# Patient Record
Sex: MALE | Race: WHITE | Employment: OTHER | URBAN - METROPOLITAN AREA
[De-identification: names, ages, dates, MRNs, and addresses within clinical notes are randomized per-mention and may not be internally consistent; named-entity substitution may affect disease eponyms.]

---

## 2020-08-13 ENCOUNTER — HOSPITAL ENCOUNTER (OUTPATIENT)
Dept: PHYSICAL THERAPY | Age: 60
Discharge: HOME | End: 2020-08-13
Payer: MEDICARE

## 2020-08-13 PROCEDURE — 97161 PT EVAL LOW COMPLEX 20 MIN: CPT

## 2020-08-13 PROCEDURE — 97110 THERAPEUTIC EXERCISES: CPT

## 2020-08-13 NOTE — THERAPY EVALUATION
Ruthy Yo : 1960 [place] Secondary:  [facility] [address] [facility], [address], [place] [address] [phone]   [phone] OUTPATIENT PHYSICAL THERAPY:Initial Assessment 2020 ICD-10: Treatment Diagnosis: K59.02 Outlet dysfunction constipation, R27.8 Lack of coordination (muscle incoordination), M62.83 Muscle spasm Precautions/Allergies:  
Patient has no allergy information on record. TREATMENT PLAN: 
Effective Dates: 2020 TO 2020 (90 days). Frequency/Duration: Follow 1 time a week for 6 weeks MEDICAL/REFERRING DIAGNOSIS: 
Outlet dysfunction constipation [K59.02] DATE OF ONSET: 3 years REFERRING PHYSICIAN: Mariana Arrieta MD MD Orders: evaluate and treat Return MD Appointment: 2020 INITIAL ASSESSMENT:  Mr. Juan Alarcon presents with over activity and impaired coordination of pelvic floor muscles. He has a complex past medical history of bowel dysfunction. He is unable to coordinate a pelvic floor muscle contraction or relaxation. He required verbal cuing for proper activation and isolation of pelvic floor muscles. He has pain with palpation to the coccyx and PFM via rectal examination. He will continue to benefit from skilled physical therapy to address above mentioned deficits and restore normal PFM function post operatively to minimize urinary incontinence PROBLEM LIST (Impacting functional limitations): 1. Decreased Flexibility/Joint Mobility 2. Decreased coordination 3. Decreased strength of PFM INTERVENTIONS PLANNED: 
1. Neuromuscular re-education 2. Biofeedback as needed 3. HEP 4. Bowel retraining 5. Bowelducation 6. Therapeutic Activites 7. Therapeutic Exercise/Strengthening 8. Bladder training and education GOALS: (Goals have been discussed and agreed upon with patient.) Short Term Goals: (3 weeks) 1. Pt will demonstrate understanding of and ability to teach back appropriate water and fiber intake, toileting frequency, and positioning for improved self-management of symptoms. 2. Pt will demonstrate understanding of and ability to teach back two strategies to reduce constipation and improve toileting to minimize strain on the pelvic floor muscles. 3. Pt will demonstrate ability to perform diaphragmatic breathing in multiple positions in order to assist with pelvic floor lengthening and reduced discomfort to aid in bowel evacuation. 4. Pt will engage in 30 minutes of physical activity per day in order to assist in digestion and ease of passing stools. Long Term Goals: (6 weeks) 1. Pt will demonstrate ability to increase intra-abdominal pressure and eccentrically contract the pelvic floor muscles without breath holding, as assessed by digitally or with use of sEMG biofeedback. 2. Pt will report minimal to no pain upon examination of the levator ani muscles and report minimal or no discomfort with bowel evacuation. 3. Pt will report being able to wean from taking stool softeners and laxatives to have a bowel movement Outcome Measure: NIH- Chronic Prostatitis Symptom Index (NIH-CPSI) for Males Initial:  Most Recent:   
Pain 0 Urinary Symptoms 0   
QOL Index 0 Interpretation of Score: The NIH-CPSI has a total score range from 0 to 43, and it includes three subscales addressing pain (score range 021), urinary symptoms (score range 010), and quality of life (QOL) (score range 012). Medical Necessity:  
· Patient is expected to demonstrate progress in  coordination and functional technique to minimize bowel dysfunctions. Reason for Services/Other Comments: 
· Patient continues to require skilled intervention due to above mentioned deficits. Total Duration: PT Patient Time In/Time Out Time In: 1100 Time Out: 1200 Rehabilitation Potential For Stated Goals: Vilinda Angelucci 
 Regarding Willam Almonte therapy, I certify that the treatment plan above will be carried out by a therapist or under their direction. Thank you for this referral, 
VITALIY CamachoT Referring Physician Signature: Roberto Gordon MD              Date Ambulatory/Rehab Services H2 Model Falls Risk Assessment Risk Factors: 
     (1)  Gender [Male] Ability to Rise from Chair: 
     (0)  Ability to rise in a single movement Falls Prevention Plan: No modifications necessary Total: (5 or greater = High Risk): 1 ©2010 [facility] [employer] Patent #[id number]. Federal Law prohibits the replication, distribution or use without written permission from [facility] [employer] HISTORY:  
History of Present Injury/Illness (Reason for Referral): 
Pt is a 57 yo male referred to pelvic floor physical therapy (PFPT) 2/2 outlet dysfunction by Roberto Gordon MD.  Patient reports his colon closed up after his heart surgery (3 years ago). It took him a while for an MD to listen to him. The pain is gone from his colon but he has trouble pooping. He has to make it liquid in order to poop. He takes dulcolax and prune juice. Miralax doesn't make it liquid anymore. Was doing physical therapy in [facility] for pelvic floor but they did not do any internal. The drops have helped. He has to go to bed at 6 at night because he will have to poop all night. Urinary: Frequency 5-9 x/day, 0 x/night. Positive for urinary frequency, incomplete emptying and urinary hesitancy/intermittency. Negative for stress urinary incontinence, urge urinary incontinence, urinary urgency, dysuria, hematuria, nocturia and nocturnal enuresis. Pt does not use pads for protection; 0 pads per day (PPD).  
          Fluid intake: 3-4 bottles  water/day; bladder irritants include: decaf tea, coffee in am 
 Bowel: Frequency every 12 hours (2-3 hours to get it out). Positive for pain with bowel movement, pushing/straining with bowel movement, constipation and incomplete emptying. Negative for fecal incontinence. Pt does not use pads for protection; 0 pads per day (PPD). Use of stool softeners or laxatives? YES Sexual: Pt is sexually active with female partners. negative for dyspareunia. History of sexual abuse:  NO:  
       Pelvic Pain: varies, more rectal pain Past Medical History/Comorbidities:  
Mr. Lizz Geller  has no past medical history on file. Mr. Lizz Geller  has no past surgical history on file. Social History/Living Environment:  
  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   
      Sex:  male Age:  56 y.o. Current Medications:  No current outpatient medications on file. Date Last Reviewed:  8/13/2020 Number of Personal Factors/Comorbidities that affect the Plan of Care: 1-2: MODERATE COMPLEXITY EXAMINATION:  
 
OBSERVATION:  
External Observations: ? Voluntary contraction: [] absent     [x] present 
? Skin integrity: bruising noted around coccyx and in between gluteal folds Pelvic Floor Muscle (PFM) Assessment: ? Muscle volume: [] decreased     [x] WNL     [] Defect ? PFM tension: [] decreased     [x] increased     [] WNL Contraction ability: 
Voluntary contraction: [] absent     [x] weak     [] moderate      [] strong Voluntary relaxation: [x] absent     [] partial     [] complete MMT: 2/5 Overflow: [] absent     [] min     [x] mod     [x] severe / Compensatory mm groups include abomindals Palpation: via rectal canal  
Superficial Pelvic Floor Musculature (PFM): Tender? Intermediate PFM Tender? Deep PFM Tender?   
Superficial Transverse Perineal [] Right  [] Left  [x]N/T Deep Transverse Perineal [] Right  [] Left  [x]N/T Puborectalis [x] Right  [x] Left  []N/T Ischiocavernosus [] Right  [] Left  [x]N/T Compressor Urethra [] Right  [] Left  [x]N/T Pubococcygeus [x] Right  [x] Left  []N/T Bulbocavernosus [] Right  [] Left  [x]N/T   Ileococcygeus [x] Right  [x] Left  []N/T  
    Obturator Internus [x] Right  [x] Left  []N/T Coccygeus [x] Right  [x] Left  []N/T Breath assessment: 
Coordination of pelvic floor muscles with breath: moderate pelvic floor muscle excursion Body Structures Involved: 1. Muscles Body Functions Affected: 1. Genitourinary 2. Neuromusculoskeletal 
3. Movement Related Activities and Participation Affected: 1. Self Care 2. Domestic Life 3. Interpersonal Interactions and Relationships 4. Community, Social and [place] Number of elements that affect the Plan of Care: 3: MODERATE COMPLEXITY CLINICAL PRESENTATION:  
Presentation: Evolving clinical presentation with changing clinical characteristics: MODERATE COMPLEXITY CLINICAL DECISION MAKING:  
  
Use of outcome tool(s) and clinical judgement create a POC that gives a: Questionable prediction of patient's progress: MODERATE COMPLEXITY

## 2020-08-13 NOTE — PROGRESS NOTES
Grisel Cortez  : 1960  [place]  Secondary:  [address]  at [facility]  [address], [address], [place] [address]  [phone]   [phone]      OUTPATIENT PHYSICAL THERAPY: Daily Treatment Note 2020  ICD-10: Treatment Diagnosis: K59.02 Outlet dysfunction constipation, R27.8 Lack of coordination (muscle incoordination), M62.83 Muscle spasm    Precautions/Allergies:   Patient has no allergy information on record. TREATMENT PLAN:  Effective Dates: 2020 TO 2020 (90 days). Frequency/Duration: Follow 1 time a week for 6 weeks MEDICAL/REFERRING DIAGNOSIS:  Outlet dysfunction constipation [K59.02]   DATE OF ONSET: 3 years  REFERRING PHYSICIAN: Adriano Patino MD MD Orders: evaluate and treat  Return MD Appointment: 2020     Pre-treatment Symptoms/Complaints:  See evaluation  Pain: Initial:   0 Post Session:  0/10   Medications Last Reviewed:  2020   Updated Objective Findings:  See evaluation note from today   TREATMENT:   THERAPEUTIC EXERCISE: (10 minutes):  Exercises per grid below to improve mobility, strength and coordination. Required minimal visual, verbal and manual cues to promote proper body alignment, promote proper body posture and promote proper body mechanics. Progressed resistance, range and repetitions as indicated. All exercises performed with emphasis on kegel and breathing in order improve coordination, awareness and to minimize symptoms. Date:  20 Date:   Date:     Activity/Exercise Parameters Parameters Parameters   Patient Education Discussed HEP and POC     Drops Reviewed for HEP     Breathing Reviewed for HEP                                Pt gives verbal consent to internal  assessment/treatment no chaperon present.      [employer] Portal     Treatment/Session Summary:  Pt reports good understanding of plan of care, as well as prescribed home exercise program.  All questions were answered to pt's satisfaction. Pt was invited to call with any further questions or concerns. · Response to Treatment:  see evaluation. · Communication/Consultation:  None today  · Equipment provided today:  None today  · Recommendations/Intent for next treatment session: Next visit will focus on manual therapy, defecation dynamics, stretches.   Total Treatment Billable Duration:  10 minutes  PT Patient Time In/Time Out  Time In: 1100  Time Out: 210 [facility], DPT    Future Appointments   Date Time Provider Delmar Green   8/18/2020  8:00 AM Louretta Canes, DPT [facility]   8/31/2020  8:00 AM Louretta Canes, DPT [facility]   9/8/2020  8:00 AM Louretta Canes, DPT [facility]   9/14/2020  8:00 AM Louretta Canes, DPT [facility]   9/21/2020  8:00 AM Louretta Canes, DPT [facility]   9/28/2020  8:00 AM Louretta Canes, DPT [facility]

## 2020-08-18 ENCOUNTER — HOSPITAL ENCOUNTER (OUTPATIENT)
Dept: PHYSICAL THERAPY | Age: 60
Discharge: HOME | End: 2020-08-18
Payer: MEDICARE

## 2020-08-18 PROCEDURE — 97140 MANUAL THERAPY 1/> REGIONS: CPT

## 2020-08-18 PROCEDURE — 97110 THERAPEUTIC EXERCISES: CPT

## 2020-08-18 NOTE — PROGRESS NOTES
Julia Shoaib  : 1960  [place]  Secondary:  [address]  at [address], [address], [place] [address]  [phone]   [phone]      OUTPATIENT PHYSICAL THERAPY: Daily Treatment Note 2020  ICD-10: Treatment Diagnosis: K59.02 Outlet dysfunction constipation, R27.8 Lack of coordination (muscle incoordination), M62.83 Muscle spasm    Precautions/Allergies:   Patient has no allergy information on record. TREATMENT PLAN:  Effective Dates: 2020 TO 2020 (90 days). Frequency/Duration: Follow 1 time a week for 6 weeks MEDICAL/REFERRING DIAGNOSIS:  Outlet dysfunction constipation [K59.02]   DATE OF ONSET: 3 years  REFERRING PHYSICIAN: Breanna Rai MD MD Orders: evaluate and treat  Return MD Appointment: 2020     Pre-treatment Symptoms/Complaints:  Patient reports no changes. Pain: Initial:   0 Post Session:  0/10   Medications Last Reviewed:  2020   Updated Objective Findings:  see below   Pt is a 57 yo male referred to pelvic floor physical therapy (PFPT) 2/2 outlet dysfunction by Breanna Rai MD.  Patient reports his colon closed up after his heart surgery (3 years ago). It took him a while for an MD to listen to him. The pain is gone from his colon but he has trouble pooping. He has to make it liquid in order to poop. He takes dulcolax and prune juice. Miralax doesn't make it liquid anymore. Was doing physical therapy in [facility] for pelvic floor but they did not do any internal. The drops have helped. He has to go to bed at 6 at night because he will have to poop all night.      Urinary: Frequency 5-9 x/day, 0 x/night. Positive for urinary frequency, incomplete emptying and urinary hesitancy/intermittency. Negative for stress urinary incontinence, urge urinary incontinence, urinary urgency, dysuria, hematuria, nocturia and nocturnal enuresis.  Pt does not use pads for protection; 0 pads per day (PPD). Fluid intake: 3-4 bottles  water/day; bladder irritants include: decaf tea, coffee in am  Bowel: Frequency every 12 hours (2-3 hours to get it out). Positive for pain with bowel movement, pushing/straining with bowel movement, constipation and incomplete emptying. Negative for fecal incontinence. Pt does not use pads for protection; 0 pads per day (PPD). Use of stool softeners or laxatives? YES  Sexual: Pt is sexually active with female partners. negative for dyspareunia. History of sexual abuse:  NO:          Pelvic Pain: varies, more rectal pain  External Observations:  · Voluntary contraction: []? absent     [x]? present  · Skin integrity: bruising noted around coccyx and in between gluteal folds     Pelvic Floor Muscle (PFM) Assessment:     · Muscle volume: []? decreased     [x]? WNL     []? Defect  · PFM tension: []? decreased     [x]? increased     []? WNL     Contraction ability:  Voluntary contraction: []? absent     [x]? weak     []? moderate      []? strong  Voluntary relaxation: [x]? absent     []? partial     []? complete   MMT: 2/5   Overflow: []? absent     []? min     [x]? mod     [x]? severe / Compensatory mm groups include abomindals           Palpation: via rectal canal   Superficial Pelvic Floor Musculature (PFM): Tender? Intermediate PFM Tender? Deep PFM Tender? Superficial Transverse Perineal []? Right  []? Left  [x]? N/T Deep Transverse Perineal []? Right  []? Left  [x]? N/T Puborectalis [x]? Right  [x]? Left  []? N/T   Ischiocavernosus []? Right  []? Left  [x]? N/T Compressor Urethra []? Right  []? Left  [x]? N/T Pubococcygeus [x]? Right  [x]? Left  []? N/T   Bulbocavernosus []? Right  []? Left  [x]? N/T     Ileococcygeus [x]? Right  [x]? Left  []? N/T           Obturator Internus [x]? Right  [x]? Left  []? N/T           Coccygeus [x]? Right  [x]? Left  []? N/T         Breath assessment:  Coordination of pelvic floor muscles with breath: moderate pelvic floor muscle excursion     TREATMENT:   THERAPEUTIC EXERCISE: (15 minutes):  Exercises per grid below to improve mobility, strength and coordination. Required minimal visual, verbal and manual cues to promote proper body alignment, promote proper body posture and promote proper body mechanics. Progressed resistance, range and repetitions as indicated. All exercises performed with emphasis on kegel and breathing in order improve coordination, awareness and to minimize symptoms. Date:  8-12-20 Date:  8-18-20 Date:     Activity/Exercise Parameters Parameters Parameters   Patient Education Discussed HEP and POC     Drops Reviewed for HEP     Breathing Reviewed for HEP     Figure 4  30 sec hold x 3      butterfly  30 sec hold x 3                     Pt gives verbal consent to internal  assessment/treatment no chaperon present. MANUAL THERAPY: (45 minutes) to improve soft tissue tone    Date Type Location Comments   8/18/2020 Internal assessment/treatment Rectal canal CTM, SP, traction to coccyx      glutes STM                                   (Used abbreviations: MET - muscle energy technique; SCS- Strain counter strain; CTM-Connective tissue mobilizations; CR- Contract/relax; SP- Sustained pressure, TrP-Trigger point release, IASTM- Instrument assisted soft tissue mobilizations, TDN-Trigger point dry needling)        [facility]     Treatment/Session Summary:  Pt reports good understanding of plan of care, as well as prescribed home exercise program.  All questions were answered to pt's satisfaction. Pt was invited to call with any further questions or concerns. · Response to Treatment:  Patient's muscles were very tight today. Manual helped relax them and patient reports he feels much better. Added two stretches to his HEP. Next visit try dilators and taping of coccyx.    · Communication/Consultation:  None today  · Equipment provided today:  None today  · Recommendations/Intent for next treatment session: Next visit will focus on manual therapy, defecation dynamics, stretches.   Total Treatment Billable Duration:  10 minutes there ex, 45 minutes manual  PT Patient Time In/Time Out  Time In: 0800  Time Out: 0900  Christelle Jj DPT    Future Appointments   Date Time Provider Delmar Green   8/31/2020  8:00 AM CHARLES Mohamud [facility]   9/8/2020  8:00 AM [facility]   9/14/2020  8:00 AM [facility]   9/21/2020  8:00 AM CHARLES Mohamud   9/28/2020  8:00 AM CHARLES Mohamud [facility]

## 2020-08-31 ENCOUNTER — HOSPITAL ENCOUNTER (OUTPATIENT)
Dept: PHYSICAL THERAPY | Age: 60
Discharge: HOME | End: 2020-08-31
Payer: MEDICARE

## 2020-08-31 PROCEDURE — 97140 MANUAL THERAPY 1/> REGIONS: CPT

## 2020-08-31 NOTE — PROGRESS NOTES
Bao Yu  : 1960  [place]  Secondary:  [address]  at [facility]  [address], [address], [place]  [phone]   [phone]      OUTPATIENT PHYSICAL THERAPY: Daily Treatment Note 2020  ICD-10: Treatment Diagnosis: K59.02 Outlet dysfunction constipation, R27.8 Lack of coordination (muscle incoordination), M62.83 Muscle spasm    Precautions/Allergies:   Patient has no allergy information on record. TREATMENT PLAN:  Effective Dates: 2020 TO 2020 (90 days). Frequency/Duration: Follow 1 time a week for 6 weeks MEDICAL/REFERRING DIAGNOSIS:  Outlet dysfunction constipation [K59.02]   DATE OF ONSET: 3 years  REFERRING PHYSICIAN: Randal Mckeon MD MD Orders: evaluate and treat  Return MD Appointment: 2020     Pre-treatment Symptoms/Complaints:  Patient reports he is about the same. After the last visit he didn't feel as tight. He still had to have liquid poop. Reports the doctor can feel and improvement. Pain: Initial:   0 Post Session:  0/10   Medications Last Reviewed:  2020   Updated Objective Findings:  see below   Pt is a 55 yo male referred to pelvic floor physical therapy (PFPT) 2/2 outlet dysfunction by Randal Mckeon MD.  Patient reports his colon closed up after his heart surgery (3 years ago). It took him a while for an MD to listen to him. The pain is gone from his colon but he has trouble pooping. He has to make it liquid in order to poop. He takes dulcolax and prune juice. Miralax doesn't make it liquid anymore. Was doing physical therapy in [facility] for pelvic floor but they did not do any internal. The drops have helped. He has to go to bed at 6 at night because he will have to poop all night.      Urinary: Frequency 5-9 x/day, 0 x/night. Positive for urinary frequency, incomplete emptying and urinary hesitancy/intermittency.  Negative for stress urinary incontinence, urge urinary incontinence, urinary urgency, dysuria, hematuria, nocturia and nocturnal enuresis. Pt does not use pads for protection; 0 pads per day (PPD). Fluid intake: 3-4 bottles  water/day; bladder irritants include: decaf tea, coffee in am  Bowel: Frequency every 12 hours (2-3 hours to get it out). Positive for pain with bowel movement, pushing/straining with bowel movement, constipation and incomplete emptying. Negative for fecal incontinence. Pt does not use pads for protection; 0 pads per day (PPD). Use of stool softeners or laxatives? YES  Sexual: Pt is sexually active with female partners. negative for dyspareunia. History of sexual abuse:  NO:          Pelvic Pain: varies, more rectal pain  External Observations:  · Voluntary contraction: []? absent     [x]? present  · Skin integrity: bruising noted around coccyx and in between gluteal folds     Pelvic Floor Muscle (PFM) Assessment:     · Muscle volume: []? decreased     [x]? WNL     []? Defect  · PFM tension: []? decreased     [x]? increased     []? WNL     Contraction ability:  Voluntary contraction: []? absent     [x]? weak     []? moderate      []? strong  Voluntary relaxation: [x]? absent     []? partial     []? complete   MMT: 2/5   Overflow: []? absent     []? min     [x]? mod     [x]? severe / Compensatory mm groups include abomindals           Palpation: via rectal canal   Superficial Pelvic Floor Musculature (PFM): Tender? Intermediate PFM Tender? Deep PFM Tender? Superficial Transverse Perineal []? Right  []? Left  [x]? N/T Deep Transverse Perineal []? Right  []? Left  [x]? N/T Puborectalis [x]? Right  [x]? Left  []? N/T   Ischiocavernosus []? Right  []? Left  [x]? N/T Compressor Urethra []? Right  []? Left  [x]? N/T Pubococcygeus [x]? Right  [x]? Left  []? N/T   Bulbocavernosus []? Right  []? Left  [x]? N/T     Ileococcygeus [x]? Right  [x]? Left  []? N/T           Obturator Internus [x]? Right  [x]? Left  []? N/T           Coccygeus [x]? Right  [x]? Left  []? N/T         Breath assessment:  Coordination of pelvic floor muscles with breath: moderate pelvic floor muscle excursion     TREATMENT:   THERAPEUTIC EXERCISE: (0 minutes):  Exercises per grid below to improve mobility, strength and coordination. Required minimal visual, verbal and manual cues to promote proper body alignment, promote proper body posture and promote proper body mechanics. Progressed resistance, range and repetitions as indicated. All exercises performed with emphasis on kegel and breathing in order improve coordination, awareness and to minimize symptoms. Date:  8-12-20 Date:  8-18-20 Date:     Activity/Exercise Parameters Parameters Parameters   Patient Education Discussed HEP and POC     Drops Reviewed for HEP     Breathing Reviewed for HEP     Figure 4  30 sec hold x 3      butterfly  30 sec hold x 3                     Pt gives verbal consent to internal  assessment/treatment no chaperon present. MANUAL THERAPY: (45 minutes) to improve soft tissue tone    Date Type Location Comments   8/31/2020 Internal assessment/treatment Rectal canal CTM, SP, traction to coccyx                                          (Used abbreviations: MET - muscle energy technique; SCS- Strain counter strain; CTM-Connective tissue mobilizations; CR- Contract/relax; SP- Sustained pressure, TrP-Trigger point release, IASTM- Instrument assisted soft tissue mobilizations, TDN-Trigger point dry needling)        [employer] Portal     Treatment/Session Summary:  Pt reports good understanding of plan of care, as well as prescribed home exercise program.  All questions were answered to pt's satisfaction. Pt was invited to call with any further questions or concerns. · Response to Treatment:  Patient's muscles were less tight today. Better contract/relax. Discussed trying twice a week starting next week.    · Communication/Consultation:  None today  · Equipment provided today:  None today  · Recommendations/Intent for next treatment session: Next visit will focus on manual therapy, defecation dynamics, stretches.   Total Treatment Billable Duration:   45 minutes manual  PT Patient Time In/Time Out  Time In: 0800  Time Out: 0900  Dagoberto Gibson DPT    Future Appointments   Date Time Provider Delmar Green   9/8/2020  8:00 AM CHARLES Allen   9/14/2020  8:[facility]   9/21/2020  8:00 AM CHARLES Allen [facility]   9/28/2020  8:00 [facility]

## 2020-09-08 ENCOUNTER — HOSPITAL ENCOUNTER (OUTPATIENT)
Dept: PHYSICAL THERAPY | Age: 60
Discharge: HOME | End: 2020-09-08
Payer: MEDICARE

## 2020-09-08 PROCEDURE — 97140 MANUAL THERAPY 1/> REGIONS: CPT

## 2020-09-08 NOTE — PROGRESS NOTES
Gumaro Gordillo  : 1960  [place]  Secondary:  [address]  at [facility]  [address], [address], [place] [address]  [phone]   [phone]      OUTPATIENT PHYSICAL THERAPY: Daily Treatment Note 2020  ICD-10: Treatment Diagnosis: K59.02 Outlet dysfunction constipation, R27.8 Lack of coordination (muscle incoordination), M62.83 Muscle spasm    Precautions/Allergies:   Patient has no allergy information on record. TREATMENT PLAN:  Effective Dates: 2020 TO 2020 (90 days). Frequency/Duration: Follow 1 time a week for 6 weeks MEDICAL/REFERRING DIAGNOSIS:  Outlet dysfunction constipation [K59.02]   DATE OF ONSET: 3 years  REFERRING PHYSICIAN: Charisse Ernst MD MD Orders: evaluate and treat  Return MD Appointment: 2020     Pre-treatment Symptoms/Complaints:  Patient reports he felt a difference for a day. He felt more open. No pain. Pain: Initial:   0 Post Session:  0/10   Medications Last Reviewed:  2020   Updated Objective Findings:  see below   Pt is a 57 yo male referred to pelvic floor physical therapy (PFPT) 2/2 outlet dysfunction by Charisse Ernst MD.  Patient reports his colon closed up after his heart surgery (3 years ago). It took him a while for an MD to listen to him. The pain is gone from his colon but he has trouble pooping. He has to make it liquid in order to poop. He takes dulcolax and prune juice. Miralax doesn't make it liquid anymore. Was doing physical therapy in [facility] for pelvic floor but they did not do any internal. The drops have helped. He has to go to bed at 6 at night because he will have to poop all night.      Urinary: Frequency 5-9 x/day, 0 x/night. Positive for urinary frequency, incomplete emptying and urinary hesitancy/intermittency. Negative for stress urinary incontinence, urge urinary incontinence, urinary urgency, dysuria, hematuria, nocturia and nocturnal enuresis.  Pt does not use pads for protection; 0 pads per day (PPD). Fluid intake: 3-4 bottles  water/day; bladder irritants include: decaf tea, coffee in am  Bowel: Frequency every 12 hours (2-3 hours to get it out). Positive for pain with bowel movement, pushing/straining with bowel movement, constipation and incomplete emptying. Negative for fecal incontinence. Pt does not use pads for protection; 0 pads per day (PPD). Use of stool softeners or laxatives? YES  Sexual: Pt is sexually active with female partners. negative for dyspareunia. History of sexual abuse:  NO:          Pelvic Pain: varies, more rectal pain  External Observations:  · Voluntary contraction: []? absent     [x]? present  · Skin integrity: bruising noted around coccyx and in between gluteal folds     Pelvic Floor Muscle (PFM) Assessment:     · Muscle volume: []? decreased     [x]? WNL     []? Defect  · PFM tension: []? decreased     [x]? increased     []? WNL     Contraction ability:  Voluntary contraction: []? absent     [x]? weak     []? moderate      []? strong  Voluntary relaxation: [x]? absent     []? partial     []? complete   MMT: 2/5   Overflow: []? absent     []? min     [x]? mod     [x]? severe / Compensatory mm groups include abomindals           Palpation: via rectal canal   Superficial Pelvic Floor Musculature (PFM): Tender? Intermediate PFM Tender? Deep PFM Tender? Superficial Transverse Perineal []? Right  []? Left  [x]? N/T Deep Transverse Perineal []? Right  []? Left  [x]? N/T Puborectalis [x]? Right  [x]? Left  []? N/T   Ischiocavernosus []? Right  []? Left  [x]? N/T Compressor Urethra []? Right  []? Left  [x]? N/T Pubococcygeus [x]? Right  [x]? Left  []? N/T   Bulbocavernosus []? Right  []? Left  [x]? N/T     Ileococcygeus [x]? Right  [x]? Left  []? N/T           Obturator Internus [x]? Right  [x]? Left  []? N/T           Coccygeus [x]? Right  [x]? Left  []? N/T         Breath assessment:  Coordination of pelvic floor muscles with breath: moderate pelvic floor muscle excursion     TREATMENT:   THERAPEUTIC EXERCISE: (0 minutes):  Exercises per grid below to improve mobility, strength and coordination. Required minimal visual, verbal and manual cues to promote proper body alignment, promote proper body posture and promote proper body mechanics. Progressed resistance, range and repetitions as indicated. All exercises performed with emphasis on kegel and breathing in order improve coordination, awareness and to minimize symptoms. Date:  8-12-20 Date:  8-18-20 Date:     Activity/Exercise Parameters Parameters Parameters   Patient Education Discussed HEP and POC     Drops Reviewed for HEP     Breathing Reviewed for HEP     Figure 4  30 sec hold x 3      butterfly  30 sec hold x 3                     Pt gives verbal consent to internal  assessment/treatment no chaperon present. MANUAL THERAPY: (45 minutes) to improve soft tissue tone    Date Type Location Comments   9/8/2020 Internal assessment/treatment Rectal canal CTM, SP, traction to coccyx                                          (Used abbreviations: MET - muscle energy technique; SCS- Strain counter strain; CTM-Connective tissue mobilizations; CR- Contract/relax; SP- Sustained pressure, TrP-Trigger point release, IASTM- Instrument assisted soft tissue mobilizations, TDN-Trigger point dry needling)        [facility]     Treatment/Session Summary:  Pt reports good understanding of plan of care, as well as prescribed home exercise program.  All questions were answered to pt's satisfaction. Pt was invited to call with any further questions or concerns. · Response to Treatment:  Patient's muscles were less tight today. Discussed doing 1/2 capful of the dulcolax to see how he can eliminate with less medicine. Will start two times a week.    · Communication/Consultation:  None today  · Equipment provided today:  None today  · Recommendations/Intent for next treatment session: Next visit will focus on manual therapy, defecation dynamics, stretches.   Total Treatment Billable Duration:   45 minutes manual  PT Patient Time In/Time Out  Time In: 0800  Time Out: 0900  Lobo Sheridan DPT    Future Appointments   Date Time Provider Delmar Green   9/10/2020  8:00 AM CHARLES De Jesus [facility]   9/14/2020  8:00 AM CHARLES De Jesus [facility]   9/17/2020  8:00 AM CHARLES [facility]PT [facility]   9/21/2020  8:00 AM CHARLES De Jesus [facility]   9/24/2020  8:00 AM CHARLES De Jesus [facility]   9/28/2020  8:00 AM CHARLES De JesusPT [facility]   10/1/2020  8:00 AM CHARLES De JesusPT [facility]

## 2020-09-10 ENCOUNTER — HOSPITAL ENCOUNTER (OUTPATIENT)
Dept: PHYSICAL THERAPY | Age: 60
Discharge: HOME | End: 2020-09-10
Payer: MEDICARE

## 2020-09-10 PROCEDURE — 97140 MANUAL THERAPY 1/> REGIONS: CPT

## 2020-09-10 NOTE — PROGRESS NOTES
Grisel Cortez  : 1960  [place]  Secondary:  [address]  at [facility]  [address], [address], [place] [address]  [phone]   [phone]      OUTPATIENT PHYSICAL THERAPY: Daily Treatment Note 9/10/2020  ICD-10: Treatment Diagnosis: K59.02 Outlet dysfunction constipation, R27.8 Lack of coordination (muscle incoordination), M62.83 Muscle spasm    Precautions/Allergies:   Patient has no allergy information on record. TREATMENT PLAN:  Effective Dates: 2020 TO 2020 (90 days). Frequency/Duration: Follow 1 time a week for 6 weeks MEDICAL/REFERRING DIAGNOSIS:  Outlet dysfunction constipation [K59.02]   DATE OF ONSET: 3 years  REFERRING PHYSICIAN: Adriano Patino MD MD Orders: evaluate and treat  Return MD Appointment: 2020     Pre-treatment Symptoms/Complaints:  Patient reports he feels a difference after therapy. Pain: Initial:   0 Post Session:  0/10   Medications Last Reviewed:  9/10/2020   Updated Objective Findings:  see below   Pt is a 55 yo male referred to pelvic floor physical therapy (PFPT) 2/2 outlet dysfunction by Adriano Patino MD.  Patient reports his colon closed up after his heart surgery (3 years ago). It took him a while for an MD to listen to him. The pain is gone from his colon but he has trouble pooping. He has to make it liquid in order to poop. He takes dulcolax and prune juice. Miralax doesn't make it liquid anymore. Was doing physical therapy in [facility] for pelvic floor but they did not do any internal. The drops have helped. He has to go to bed at 6 at night because he will have to poop all night.      Urinary: Frequency 5-9 x/day, 0 x/night. Positive for urinary frequency, incomplete emptying and urinary hesitancy/intermittency. Negative for stress urinary incontinence, urge urinary incontinence, urinary urgency, dysuria, hematuria, nocturia and nocturnal enuresis.  Pt does not use pads for protection; 0 pads per day (PPD). Fluid intake: 3-4 bottles  water/day; bladder irritants include: decaf tea, coffee in am  Bowel: Frequency every 12 hours (2-3 hours to get it out). Positive for pain with bowel movement, pushing/straining with bowel movement, constipation and incomplete emptying. Negative for fecal incontinence. Pt does not use pads for protection; 0 pads per day (PPD). Use of stool softeners or laxatives? YES  Sexual: Pt is sexually active with female partners. negative for dyspareunia. History of sexual abuse:  NO:          Pelvic Pain: varies, more rectal pain  External Observations:  · Voluntary contraction: []? absent     [x]? present  · Skin integrity: bruising noted around coccyx and in between gluteal folds     Pelvic Floor Muscle (PFM) Assessment:     · Muscle volume: []? decreased     [x]? WNL     []? Defect  · PFM tension: []? decreased     [x]? increased     []? WNL     Contraction ability:  Voluntary contraction: []? absent     [x]? weak     []? moderate      []? strong  Voluntary relaxation: [x]? absent     []? partial     []? complete   MMT: 2/5   Overflow: []? absent     []? min     [x]? mod     [x]? severe / Compensatory mm groups include abomindals           Palpation: via rectal canal   Superficial Pelvic Floor Musculature (PFM): Tender? Intermediate PFM Tender? Deep PFM Tender? Superficial Transverse Perineal []? Right  []? Left  [x]? N/T Deep Transverse Perineal []? Right  []? Left  [x]? N/T Puborectalis [x]? Right  [x]? Left  []? N/T   Ischiocavernosus []? Right  []? Left  [x]? N/T Compressor Urethra []? Right  []? Left  [x]? N/T Pubococcygeus [x]? Right  [x]? Left  []? N/T   Bulbocavernosus []? Right  []? Left  [x]? N/T     Ileococcygeus [x]? Right  [x]? Left  []? N/T           Obturator Internus [x]? Right  [x]? Left  []? N/T           Coccygeus [x]? Right  [x]? Left  []? N/T         Breath assessment:  Coordination of pelvic floor muscles with breath: moderate pelvic floor muscle excursion     TREATMENT:   THERAPEUTIC EXERCISE: (0 minutes):  Exercises per grid below to improve mobility, strength and coordination. Required minimal visual, verbal and manual cues to promote proper body alignment, promote proper body posture and promote proper body mechanics. Progressed resistance, range and repetitions as indicated. All exercises performed with emphasis on kegel and breathing in order improve coordination, awareness and to minimize symptoms. Date:  8-12-20 Date:  8-18-20 Date:     Activity/Exercise Parameters Parameters Parameters   Patient Education Discussed HEP and POC     Drops Reviewed for HEP     Breathing Reviewed for HEP     Figure 4  30 sec hold x 3      butterfly  30 sec hold x 3                     Pt gives verbal consent to internal  assessment/treatment no chaperon present. MANUAL THERAPY: (45 minutes) to improve soft tissue tone    Date Type Location Comments   9/10/2020 Internal assessment/treatment Rectal canal CTM, SP, traction to coccyx      abdomen Bowel massage, scar tissue mobilization                                   (Used abbreviations: MET - muscle energy technique; SCS- Strain counter strain; CTM-Connective tissue mobilizations; CR- Contract/relax; SP- Sustained pressure, TrP-Trigger point release, IASTM- Instrument assisted soft tissue mobilizations, TDN-Trigger point dry needling)        [facility] Portal     Treatment/Session Summary:  Pt reports good understanding of plan of care, as well as prescribed home exercise program.  All questions were answered to pt's satisfaction. Pt was invited to call with any further questions or concerns. · Response to Treatment:  Patient has a lot of scar tissue and tightness over abdomen. Discussed to only try to have a bowel movement for less than 5 minutes. If it doesn't work then walk away and take the medicine. He has been bearing down for >30 minutes.    · Communication/Consultation:  None today  · Equipment provided today:  None today  · Recommendations/Intent for next treatment session: Next visit will focus on manual therapy, defecation dynamics, stretches.   Total Treatment Billable Duration:   45 minutes manual  PT Patient Time In/Time Out  Time In: 0800  Time Out: 0900  Rianna Barkley DPT    Future Appointments   Date Time Provider Delmar Green   9/14/2020  8:00 AM CHARLES Jha [facility]   9/17/2020  8:00 AM CHARLES JhaPT [facility]   9/21/2020  8:00 AM CHARLES JhaPT [facility]   9/24/2020  8:00 AM CHARLES Jha [facility]   9/28/2020  8:00 AM Darell Xie DPT [facility]   10/1/2020  8:00 AM Darell Xie DPT [facility]

## 2020-09-14 ENCOUNTER — HOSPITAL ENCOUNTER (OUTPATIENT)
Dept: PHYSICAL THERAPY | Age: 60
Discharge: HOME | End: 2020-09-14
Payer: MEDICARE

## 2020-09-14 PROCEDURE — 97140 MANUAL THERAPY 1/> REGIONS: CPT

## 2020-09-14 NOTE — PROGRESS NOTES
Alba Reeves  : 1960  [place]  Secondary:  [address]  at [facility]  [address] , [address], [place] [address]  [phone]   [phone]      OUTPATIENT PHYSICAL THERAPY: Daily Treatment Note 2020  ICD-10: Treatment Diagnosis: K59.02 Outlet dysfunction constipation, R27.8 Lack of coordination (muscle incoordination), M62.83 Muscle spasm    Precautions/Allergies:   Patient has no allergy information on record. TREATMENT PLAN:  Effective Dates: 2020 TO 2020 (90 days). Frequency/Duration: Follow 1 time a week for 6 weeks MEDICAL/REFERRING DIAGNOSIS:  Outlet dysfunction constipation [K59.02]   DATE OF ONSET: 3 years  REFERRING PHYSICIAN: Marah Carson MD MD Orders: evaluate and treat  Return MD Appointment: 2020     Pre-treatment Symptoms/Complaints:  Patient reports he felt a difference Thursday night and Friday night. He could tell that the \"opening\" was bigger and not as tight. Pain: Initial:   0 Post Session:  0/10   Medications Last Reviewed:  2020   Updated Objective Findings:  see below   Pt is a 57 yo male referred to pelvic floor physical therapy (PFPT) 2/2 outlet dysfunction by Marah Carson MD.  Patient reports his colon closed up after his heart surgery (3 years ago). It took him a while for an MD to listen to him. The pain is gone from his colon but he has trouble pooping. He has to make it liquid in order to poop. He takes dulcolax and prune juice. Miralax doesn't make it liquid anymore. Was doing physical therapy in [facility] for pelvic floor but they did not do any internal. The drops have helped. He has to go to bed at 6 at night because he will have to poop all night.      Urinary: Frequency 5-9 x/day, 0 x/night. Positive for urinary frequency, incomplete emptying and urinary hesitancy/intermittency.  Negative for stress urinary incontinence, urge urinary incontinence, urinary urgency, dysuria, hematuria, nocturia and nocturnal enuresis. Pt does not use pads for protection; 0 pads per day (PPD). Fluid intake: 3-4 bottles  water/day; bladder irritants include: decaf tea, coffee in am  Bowel: Frequency every 12 hours (2-3 hours to get it out). Positive for pain with bowel movement, pushing/straining with bowel movement, constipation and incomplete emptying. Negative for fecal incontinence. Pt does not use pads for protection; 0 pads per day (PPD). Use of stool softeners or laxatives? YES  Sexual: Pt is sexually active with female partners. negative for dyspareunia. History of sexual abuse:  NO:          Pelvic Pain: varies, more rectal pain  External Observations:  · Voluntary contraction: []? absent     [x]? present  · Skin integrity: bruising noted around coccyx and in between gluteal folds     Pelvic Floor Muscle (PFM) Assessment:     · Muscle volume: []? decreased     [x]? WNL     []? Defect  · PFM tension: []? decreased     [x]? increased     []? WNL     Contraction ability:  Voluntary contraction: []? absent     [x]? weak     []? moderate      []? strong  Voluntary relaxation: [x]? absent     []? partial     []? complete   MMT: 2/5   Overflow: []? absent     []? min     [x]? mod     [x]? severe / Compensatory mm groups include abomindals           Palpation: via rectal canal   Superficial Pelvic Floor Musculature (PFM): Tender? Intermediate PFM Tender? Deep PFM Tender? Superficial Transverse Perineal []? Right  []? Left  [x]? N/T Deep Transverse Perineal []? Right  []? Left  [x]? N/T Puborectalis [x]? Right  [x]? Left  []? N/T   Ischiocavernosus []? Right  []? Left  [x]? N/T Compressor Urethra []? Right  []? Left  [x]? N/T Pubococcygeus [x]? Right  [x]? Left  []? N/T   Bulbocavernosus []? Right  []? Left  [x]? N/T     Ileococcygeus [x]? Right  [x]? Left  []? N/T           Obturator Internus [x]? Right  [x]? Left  []? N/T           Coccygeus [x]? Right  [x]? Left  []? N/T         Breath assessment:  Coordination of pelvic floor muscles with breath: moderate pelvic floor muscle excursion     TREATMENT:   THERAPEUTIC EXERCISE: (0 minutes):  Exercises per grid below to improve mobility, strength and coordination. Required minimal visual, verbal and manual cues to promote proper body alignment, promote proper body posture and promote proper body mechanics. Progressed resistance, range and repetitions as indicated. All exercises performed with emphasis on kegel and breathing in order improve coordination, awareness and to minimize symptoms. Date:  8-12-20 Date:  8-18-20 Date:     Activity/Exercise Parameters Parameters Parameters   Patient Education Discussed HEP and POC     Drops Reviewed for HEP     Breathing Reviewed for HEP     Figure 4  30 sec hold x 3      butterfly  30 sec hold x 3                     Pt gives verbal consent to internal  assessment/treatment no chaperon present. MANUAL THERAPY: (45 minutes) to improve soft tissue tone    Date Type Location Comments   9/14/2020 Internal assessment/treatment Rectal canal CTM, SP, traction to coccyx      abdomen Bowel massage, scar tissue mobilization                                   (Used abbreviations: MET - muscle energy technique; SCS- Strain counter strain; CTM-Connective tissue mobilizations; CR- Contract/relax; SP- Sustained pressure, TrP-Trigger point release, IASTM- Instrument assisted soft tissue mobilizations, TDN-Trigger point dry needling)        [facility] Portal     Treatment/Session Summary:  Pt reports good understanding of plan of care, as well as prescribed home exercise program.  All questions were answered to pt's satisfaction. Pt was invited to call with any further questions or concerns. · Response to Treatment:  Patient had more tightness and discomfort to muscles today. Patient is seeing good results so far.    · Communication/Consultation:  None today  · Equipment provided today:  None today  · Recommendations/Intent for next treatment session: Next visit will focus on manual therapy, defecation dynamics, stretches.   Total Treatment Billable Duration:   45 minutes manual  PT Patient Time In/Time Out  Time In: 0800  Time Out: 0900  Ryan Hyde DPT    Future Appointments   Date Time Provider Delmar Green   9/17/2020  8:00 AM CHARLES Rainey [facility]   9/21/2020  8:00 AM CHARLES Rainey [facility]   9/24/2020  8:00 AM CHARLES Rainey [facility]   9/28/2020  8:00 AM CHARLES Rainey [facility]   10/1/2020  8:00 AM CHARLES Rainey [facility]

## 2020-09-17 ENCOUNTER — HOSPITAL ENCOUNTER (OUTPATIENT)
Dept: PHYSICAL THERAPY | Age: 60
Discharge: HOME | End: 2020-09-17
Payer: MEDICARE

## 2020-09-17 PROCEDURE — 97140 MANUAL THERAPY 1/> REGIONS: CPT

## 2020-09-17 NOTE — PROGRESS NOTES
Francis Quezada  : 1960  [place]  Secondary:  [address]  at [facility]  [address], [address], [place] [address]  [phone]   [phone]      OUTPATIENT PHYSICAL THERAPY: Daily Treatment Note 2020  ICD-10: Treatment Diagnosis: K59.02 Outlet dysfunction constipation, R27.8 Lack of coordination (muscle incoordination), M62.83 Muscle spasm    Precautions/Allergies:   Patient has no allergy information on record. TREATMENT PLAN:  Effective Dates: 2020 TO 2020 (90 days). Frequency/Duration: Follow 1 time a week for 6 weeks MEDICAL/REFERRING DIAGNOSIS:  Outlet dysfunction constipation [K59.02]   DATE OF ONSET: 3 years  REFERRING PHYSICIAN: Cheikh Conner MD MD Orders: evaluate and treat  Return MD Appointment: 2020     Pre-treatment Symptoms/Complaints:  Patient reports he still sees improvement. He was \"pooping turds\"  Pain: Initial:   0 Post Session:  0/10   Medications Last Reviewed:  2020   Updated Objective Findings:  see below   Pt is a 55 yo male referred to pelvic floor physical therapy (PFPT) 2/2 outlet dysfunction by Cheikh Conner MD.  Patient reports his colon closed up after his heart surgery (3 years ago). It took him a while for an MD to listen to him. The pain is gone from his colon but he has trouble pooping. He has to make it liquid in order to poop. He takes dulcolax and prune juice. Miralax doesn't make it liquid anymore. Was doing physical therapy in [facility] for pelvic floor but they did not do any internal. The drops have helped. He has to go to bed at 6 at night because he will have to poop all night.      Urinary: Frequency 5-9 x/day, 0 x/night. Positive for urinary frequency, incomplete emptying and urinary hesitancy/intermittency. Negative for stress urinary incontinence, urge urinary incontinence, urinary urgency, dysuria, hematuria, nocturia and nocturnal enuresis.  Pt does not use pads for protection; 0 pads per day (PPD). Fluid intake: 3-4 bottles  water/day; bladder irritants include: decaf tea, coffee in am  Bowel: Frequency every 12 hours (2-3 hours to get it out). Positive for pain with bowel movement, pushing/straining with bowel movement, constipation and incomplete emptying. Negative for fecal incontinence. Pt does not use pads for protection; 0 pads per day (PPD). Use of stool softeners or laxatives? YES  Sexual: Pt is sexually active with female partners. negative for dyspareunia. History of sexual abuse:  NO:          Pelvic Pain: varies, more rectal pain  External Observations:  · Voluntary contraction: []? absent     [x]? present  · Skin integrity: bruising noted around coccyx and in between gluteal folds     Pelvic Floor Muscle (PFM) Assessment:     · Muscle volume: []? decreased     [x]? WNL     []? Defect  · PFM tension: []? decreased     [x]? increased     []? WNL     Contraction ability:  Voluntary contraction: []? absent     [x]? weak     []? moderate      []? strong  Voluntary relaxation: [x]? absent     []? partial     []? complete   MMT: 2/5   Overflow: []? absent     []? min     [x]? mod     [x]? severe / Compensatory mm groups include abomindals           Palpation: via rectal canal   Superficial Pelvic Floor Musculature (PFM): Tender? Intermediate PFM Tender? Deep PFM Tender? Superficial Transverse Perineal []? Right  []? Left  [x]? N/T Deep Transverse Perineal []? Right  []? Left  [x]? N/T Puborectalis [x]? Right  [x]? Left  []? N/T   Ischiocavernosus []? Right  []? Left  [x]? N/T Compressor Urethra []? Right  []? Left  [x]? N/T Pubococcygeus [x]? Right  [x]? Left  []? N/T   Bulbocavernosus []? Right  []? Left  [x]? N/T     Ileococcygeus [x]? Right  [x]? Left  []? N/T           Obturator Internus [x]? Right  [x]? Left  []? N/T           Coccygeus [x]? Right  [x]? Left  []? N/T         Breath assessment:  Coordination of pelvic floor muscles with breath: moderate pelvic floor muscle excursion     TREATMENT:   THERAPEUTIC EXERCISE: (0 minutes):  Exercises per grid below to improve mobility, strength and coordination. Required minimal visual, verbal and manual cues to promote proper body alignment, promote proper body posture and promote proper body mechanics. Progressed resistance, range and repetitions as indicated. All exercises performed with emphasis on kegel and breathing in order improve coordination, awareness and to minimize symptoms. Date:  8-12-20 Date:  8-18-20 Date:     Activity/Exercise Parameters Parameters Parameters   Patient Education Discussed HEP and POC     Drops Reviewed for HEP     Breathing Reviewed for HEP     Figure 4  30 sec hold x 3      butterfly  30 sec hold x 3                     Pt gives verbal consent to internal  assessment/treatment no chaperon present. MANUAL THERAPY: (45 minutes) to improve soft tissue tone    Date Type Location Comments   9/17/2020 Internal assessment/treatment Rectal canal CTM, SP, traction to coccyx                                          (Used abbreviations: MET - muscle energy technique; SCS- Strain counter strain; CTM-Connective tissue mobilizations; CR- Contract/relax; SP- Sustained pressure, TrP-Trigger point release, IASTM- Instrument assisted soft tissue mobilizations, TDN-Trigger point dry needling)        [facility]     Treatment/Session Summary:  Pt reports good understanding of plan of care, as well as prescribed home exercise program.  All questions were answered to pt's satisfaction. Pt was invited to call with any further questions or concerns. · Response to Treatment:  Patient has less tightness to pelvic floor today. Was able to have a better bowel movement. · Communication/Consultation:  None today  · Equipment provided today:  None today  · Recommendations/Intent for next treatment session: Next visit will focus on manual therapy, defecation dynamics, stretches.   Total Treatment Billable Duration:   45 minutes manual  PT Patient Time In/Time Out  Time In: 0800  Time Out: 0900  Safia Rangel DPT    Future Appointments   Date Time Provider Delmar Green   9/21/2020  8:00 AM CHARLES Coats [facility]   9/24/2020  8:00 AM CHARLES Coats [facility]   9/28/2020  8:00 AM CHARLES Coats [facility]   10/1/2020  8:00 AM CHARLES Coats [facility]

## 2020-09-21 ENCOUNTER — HOSPITAL ENCOUNTER (OUTPATIENT)
Dept: PHYSICAL THERAPY | Age: 60
Discharge: HOME | End: 2020-09-21
Payer: MEDICARE

## 2020-09-21 PROCEDURE — 97140 MANUAL THERAPY 1/> REGIONS: CPT

## 2020-09-21 NOTE — PROGRESS NOTES
Alexis Teran  : 1960  [place]  Secondary:  [address]  at [facility]  [address] , [address], [place] [address]  [phone]   [phone]      OUTPATIENT PHYSICAL THERAPY: Daily Treatment Note 2020  ICD-10: Treatment Diagnosis: K59.02 Outlet dysfunction constipation, R27.8 Lack of coordination (muscle incoordination), M62.83 Muscle spasm    Precautions/Allergies:   Patient has no allergy information on record. TREATMENT PLAN:  Effective Dates: 2020 TO 2020 (90 days). Frequency/Duration: Follow 1 time a week for 6 weeks MEDICAL/REFERRING DIAGNOSIS:  Outlet dysfunction constipation [K59.02]   DATE OF ONSET: 3 years  REFERRING PHYSICIAN: Cody Jackman MD MD Orders: evaluate and treat  Return MD Appointment: 2020     Pre-treatment Symptoms/Complaints:  Patient reports he got aggravated this weekend because he had a hard time. Pain: Initial:   0 Post Session:  0/10   Medications Last Reviewed:  2020   Updated Objective Findings:  see below   Pt is a 55 yo male referred to pelvic floor physical therapy (PFPT) 2/2 outlet dysfunction by Cody Jackman MD.  Patient reports his colon closed up after his heart surgery (3 years ago). It took him a while for an MD to listen to him. The pain is gone from his colon but he has trouble pooping. He has to make it liquid in order to poop. He takes dulcolax and prune juice. Miralax doesn't make it liquid anymore. Was doing physical therapy in [facility] for pelvic floor but they did not do any internal. The drops have helped. He has to go to bed at 6 at night because he will have to poop all night.      Urinary: Frequency 5-9 x/day, 0 x/night. Positive for urinary frequency, incomplete emptying and urinary hesitancy/intermittency. Negative for stress urinary incontinence, urge urinary incontinence, urinary urgency, dysuria, hematuria, nocturia and nocturnal enuresis.  Pt does not use pads for protection; 0 pads per day (PPD). Fluid intake: 3-4 bottles  water/day; bladder irritants include: decaf tea, coffee in am  Bowel: Frequency every 12 hours (2-3 hours to get it out). Positive for pain with bowel movement, pushing/straining with bowel movement, constipation and incomplete emptying. Negative for fecal incontinence. Pt does not use pads for protection; 0 pads per day (PPD). Use of stool softeners or laxatives? YES  Sexual: Pt is sexually active with female partners. negative for dyspareunia. History of sexual abuse:  NO:          Pelvic Pain: varies, more rectal pain  External Observations:  · Voluntary contraction: []? absent     [x]? present  · Skin integrity: bruising noted around coccyx and in between gluteal folds     Pelvic Floor Muscle (PFM) Assessment:     · Muscle volume: []? decreased     [x]? WNL     []? Defect  · PFM tension: []? decreased     [x]? increased     []? WNL     Contraction ability:  Voluntary contraction: []? absent     [x]? weak     []? moderate      []? strong  Voluntary relaxation: [x]? absent     []? partial     []? complete   MMT: 2/5   Overflow: []? absent     []? min     [x]? mod     [x]? severe / Compensatory mm groups include abomindals           Palpation: via rectal canal   Superficial Pelvic Floor Musculature (PFM): Tender? Intermediate PFM Tender? Deep PFM Tender? Superficial Transverse Perineal []? Right  []? Left  [x]? N/T Deep Transverse Perineal []? Right  []? Left  [x]? N/T Puborectalis [x]? Right  [x]? Left  []? N/T   Ischiocavernosus []? Right  []? Left  [x]? N/T Compressor Urethra []? Right  []? Left  [x]? N/T Pubococcygeus [x]? Right  [x]? Left  []? N/T   Bulbocavernosus []? Right  []? Left  [x]? N/T     Ileococcygeus [x]? Right  [x]? Left  []? N/T           Obturator Internus [x]? Right  [x]? Left  []? N/T           Coccygeus [x]? Right  [x]? Left  []? N/T         Breath assessment:  Coordination of pelvic floor muscles with breath: moderate pelvic floor muscle excursion     TREATMENT:   THERAPEUTIC EXERCISE: (0 minutes):  Exercises per grid below to improve mobility, strength and coordination. Required minimal visual, verbal and manual cues to promote proper body alignment, promote proper body posture and promote proper body mechanics. Progressed resistance, range and repetitions as indicated. All exercises performed with emphasis on kegel and breathing in order improve coordination, awareness and to minimize symptoms. Date:  8-12-20 Date:  8-18-20 Date:     Activity/Exercise Parameters Parameters Parameters   Patient Education Discussed HEP and POC     Drops Reviewed for HEP     Breathing Reviewed for HEP     Figure 4  30 sec hold x 3      butterfly  30 sec hold x 3                     Pt gives verbal consent to internal  assessment/treatment no chaperon present. MANUAL THERAPY: (45 minutes) to improve soft tissue tone    Date Type Location Comments   9/21/2020 Internal assessment/treatment Rectal canal CTM, SP, traction to coccyx                                          (Used abbreviations: MET - muscle energy technique; SCS- Strain counter strain; CTM-Connective tissue mobilizations; CR- Contract/relax; SP- Sustained pressure, TrP-Trigger point release, IASTM- Instrument assisted soft tissue mobilizations, TDN-Trigger point dry needling)        [employer] [facility] Portal     Treatment/Session Summary:  Pt reports good understanding of plan of care, as well as prescribed home exercise program.  All questions were answered to pt's satisfaction. Pt was invited to call with any further questions or concerns. · Response to Treatment:  Patient was able to get to deeper layers of pelvic floor today but it was more sore. · Communication/Consultation:  None today  · Equipment provided today:  None today  · Recommendations/Intent for next treatment session: Next visit will focus on manual therapy, defecation dynamics, stretches.   Total Treatment Billable Duration:   45 minutes manual  PT Patient Time In/Time Out  Time In: 0800  Time Out: 0900  Radha Yin DPT    Future Appointments   Date Time Provider Delmar Green   9/24/2020  8:00 AM Mame Cornell DPT [facility]   9/28/2020  8:00 AM CHARLES Westfall [facility]   10/1/2020  8:00 [facility]

## 2020-09-24 ENCOUNTER — HOSPITAL ENCOUNTER (OUTPATIENT)
Dept: PHYSICAL THERAPY | Age: 60
Discharge: HOME | End: 2020-09-24
Payer: MEDICARE

## 2020-09-24 PROCEDURE — 97140 MANUAL THERAPY 1/> REGIONS: CPT

## 2020-09-24 NOTE — PROGRESS NOTES
[facility]  : 1960  [place]  Secondary:  [address]  at [facility]  [address] , [address], [place]  [phone]   [phone]      OUTPATIENT PHYSICAL THERAPY: Daily Treatment Note 2020  ICD-10: Treatment Diagnosis: K59.02 Outlet dysfunction constipation, R27.8 Lack of coordination (muscle incoordination), M62.83 Muscle spasm    Precautions/Allergies:   Patient has no allergy information on record. TREATMENT PLAN:  Effective Dates: 2020 TO 2020 (90 days). Frequency/Duration: Follow 1 time a week for 6 weeks MEDICAL/REFERRING DIAGNOSIS:  Outlet dysfunction constipation [K59.02]   DATE OF ONSET: 3 years  REFERRING PHYSICIAN: Patito Mckeon MD MD Orders: evaluate and treat  Return MD Appointment: 2020     Pre-treatment Symptoms/Complaints:  Patient reports he feels less pressure after the last visit. Pain: Initial:   0 Post Session:  0/10   Medications Last Reviewed:  2020   Updated Objective Findings:  see below   Pt is a 55 yo male referred to pelvic floor physical therapy (PFPT) 2/2 outlet dysfunction by Patito Mckeon MD.  Patient reports his colon closed up after his heart surgery (3 years ago). It took him a while for an MD to listen to him. The pain is gone from his colon but he has trouble pooping. He has to make it liquid in order to poop. He takes dulcolax and prune juice. Miralax doesn't make it liquid anymore. Was doing physical therapy in [facility] for pelvic floor but they did not do any internal. The drops have helped. He has to go to bed at 6 at night because he will have to poop all night.      Urinary: Frequency 5-9 x/day, 0 x/night. Positive for urinary frequency, incomplete emptying and urinary hesitancy/intermittency. Negative for stress urinary incontinence, urge urinary incontinence, urinary urgency, dysuria, hematuria, nocturia and nocturnal enuresis.  Pt does not use pads for protection; 0 pads per day (PPD). Fluid intake: 3-4 bottles  water/day; bladder irritants include: decaf tea, coffee in am  Bowel: Frequency every 12 hours (2-3 hours to get it out). Positive for pain with bowel movement, pushing/straining with bowel movement, constipation and incomplete emptying. Negative for fecal incontinence. Pt does not use pads for protection; 0 pads per day (PPD). Use of stool softeners or laxatives? YES  Sexual: Pt is sexually active with female partners. negative for dyspareunia. History of sexual abuse:  NO:          Pelvic Pain: varies, more rectal pain  External Observations:  · Voluntary contraction: []? absent     [x]? present  · Skin integrity: bruising noted around coccyx and in between gluteal folds     Pelvic Floor Muscle (PFM) Assessment:     · Muscle volume: []? decreased     [x]? WNL     []? Defect  · PFM tension: []? decreased     [x]? increased     []? WNL     Contraction ability:  Voluntary contraction: []? absent     [x]? weak     []? moderate      []? strong  Voluntary relaxation: [x]? absent     []? partial     []? complete   MMT: 2/5   Overflow: []? absent     []? min     [x]? mod     [x]? severe / Compensatory mm groups include abomindals           Palpation: via rectal canal   Superficial Pelvic Floor Musculature (PFM): Tender? Intermediate PFM Tender? Deep PFM Tender? Superficial Transverse Perineal []? Right  []? Left  [x]? N/T Deep Transverse Perineal []? Right  []? Left  [x]? N/T Puborectalis [x]? Right  [x]? Left  []? N/T   Ischiocavernosus []? Right  []? Left  [x]? N/T Compressor Urethra []? Right  []? Left  [x]? N/T Pubococcygeus [x]? Right  [x]? Left  []? N/T   Bulbocavernosus []? Right  []? Left  [x]? N/T     Ileococcygeus [x]? Right  [x]? Left  []? N/T           Obturator Internus [x]? Right  [x]? Left  []? N/T           Coccygeus [x]? Right  [x]? Left  []? N/T         Breath assessment:  Coordination of pelvic floor muscles with breath: moderate pelvic floor muscle excursion     TREATMENT:   THERAPEUTIC EXERCISE: (0 minutes):  Exercises per grid below to improve mobility, strength and coordination. Required minimal visual, verbal and manual cues to promote proper body alignment, promote proper body posture and promote proper body mechanics. Progressed resistance, range and repetitions as indicated. All exercises performed with emphasis on kegel and breathing in order improve coordination, awareness and to minimize symptoms. Date:  8-12-20 Date:  8-18-20 Date:     Activity/Exercise Parameters Parameters Parameters   Patient Education Discussed HEP and POC     Drops Reviewed for HEP     Breathing Reviewed for HEP     Figure 4  30 sec hold x 3      butterfly  30 sec hold x 3                     Pt gives verbal consent to internal  assessment/treatment no chaperon present. MANUAL THERAPY: (45 minutes) to improve soft tissue tone    Date Type Location Comments   9/24/2020 Internal assessment/treatment Rectal canal CTM, SP, traction to coccyx, drops                                         (Used abbreviations: MET - muscle energy technique; SCS- Strain counter strain; CTM-Connective tissue mobilizations; CR- Contract/relax; SP- Sustained pressure, TrP-Trigger point release, IASTM- Instrument assisted soft tissue mobilizations, TDN-Trigger point dry needling)        [facility]     Treatment/Session Summary:  Pt reports good understanding of plan of care, as well as prescribed home exercise program.  All questions were answered to pt's satisfaction. Pt was invited to call with any further questions or concerns. · Response to Treatment:  Patient was able to get to deeper layers of pelvic floor today but it was more sore. · Communication/Consultation:  None today  · Equipment provided today:  None today  · Recommendations/Intent for next treatment session: Next visit will focus on manual therapy, defecation dynamics, stretches.   Total Treatment Billable Duration:   45 minutes manual  PT Patient Time In/Time Out  Time In: 0800  Time Out: [facility], DPT    Future Appointments   Date Time Provider Delmar Green   9/28/2020  8:00 AM CHARLES De Jesus [facility]   10/1/2020  8:00 AM CHARLES De Jesus [facility]

## 2020-09-28 ENCOUNTER — HOSPITAL ENCOUNTER (OUTPATIENT)
Dept: PHYSICAL THERAPY | Age: 60
Discharge: HOME | End: 2020-09-28
Payer: MEDICARE

## 2020-09-28 PROCEDURE — 97140 MANUAL THERAPY 1/> REGIONS: CPT

## 2020-09-28 NOTE — PROGRESS NOTES
Darian Mode  : 1960  [place]  Secondary:  [address]  at [facility]  [address] , [address], [place] [address]  [phone]   [phone]      OUTPATIENT PHYSICAL THERAPY: Daily Treatment Note 2020  ICD-10: Treatment Diagnosis: K59.02 Outlet dysfunction constipation, R27.8 Lack of coordination (muscle incoordination), M62.83 Muscle spasm    Precautions/Allergies:   Patient has no allergy information on record. TREATMENT PLAN:  Effective Dates: 2020 TO 2020 (90 days). Frequency/Duration: Follow 1 time a week for 6 weeks MEDICAL/REFERRING DIAGNOSIS:  Outlet dysfunction constipation [K59.02]   DATE OF ONSET: 3 years  REFERRING PHYSICIAN: Geovani Garcia MD MD Orders: evaluate and treat  Return MD Appointment: 2020     Pre-treatment Symptoms/Complaints:  Patient reports he hasn't taken the ducolax since last visit. He is now taking miralax twice a day and drinking more prune juice (24 ounces a day). Pain: Initial:   0 Post Session:  0/10   Medications Last Reviewed:  2020   Updated Objective Findings:  see below   Pt is a 57 yo male referred to pelvic floor physical therapy (PFPT) 2/2 outlet dysfunction by Geovani Garcia MD.  Patient reports his colon closed up after his heart surgery (3 years ago). It took him a while for an MD to listen to him. The pain is gone from his colon but he has trouble pooping. He has to make it liquid in order to poop. He takes dulcolax and prune juice. Miralax doesn't make it liquid anymore. Was doing physical therapy in [facility] for pelvic floor but they did not do any internal. The drops have helped. He has to go to bed at 6 at night because he will have to poop all night.      Urinary: Frequency 5-9 x/day, 0 x/night. Positive for urinary frequency, incomplete emptying and urinary hesitancy/intermittency.  Negative for stress urinary incontinence, urge urinary incontinence, urinary urgency, dysuria, hematuria, nocturia and nocturnal enuresis. Pt does not use pads for protection; 0 pads per day (PPD). Fluid intake: 3-4 bottles  water/day; bladder irritants include: decaf tea, coffee in am  Bowel: Frequency every 12 hours (2-3 hours to get it out). Positive for pain with bowel movement, pushing/straining with bowel movement, constipation and incomplete emptying. Negative for fecal incontinence. Pt does not use pads for protection; 0 pads per day (PPD). Use of stool softeners or laxatives? YES  Sexual: Pt is sexually active with female partners. negative for dyspareunia. History of sexual abuse:  NO:          Pelvic Pain: varies, more rectal pain  External Observations:  · Voluntary contraction: []? absent     [x]? present  · Skin integrity: bruising noted around coccyx and in between gluteal folds     Pelvic Floor Muscle (PFM) Assessment:     · Muscle volume: []? decreased     [x]? WNL     []? Defect  · PFM tension: []? decreased     [x]? increased     []? WNL     Contraction ability:  Voluntary contraction: []? absent     [x]? weak     []? moderate      []? strong  Voluntary relaxation: [x]? absent     []? partial     []? complete   MMT: 2/5   Overflow: []? absent     []? min     [x]? mod     [x]? severe / Compensatory mm groups include abomindals           Palpation: via rectal canal   Superficial Pelvic Floor Musculature (PFM): Tender? Intermediate PFM Tender? Deep PFM Tender? Superficial Transverse Perineal []? Right  []? Left  [x]? N/T Deep Transverse Perineal []? Right  []? Left  [x]? N/T Puborectalis [x]? Right  [x]? Left  []? N/T   Ischiocavernosus []? Right  []? Left  [x]? N/T Compressor Urethra []? Right  []? Left  [x]? N/T Pubococcygeus [x]? Right  [x]? Left  []? N/T   Bulbocavernosus []? Right  []? Left  [x]? N/T     Ileococcygeus [x]? Right  [x]? Left  []? N/T           Obturator Internus [x]? Right  [x]? Left  []? N/T           Coccygeus [x]? Right  [x]? Left  []? N/T       Breath assessment:  Coordination of pelvic floor muscles with breath: moderate pelvic floor muscle excursion     TREATMENT:   THERAPEUTIC EXERCISE: (0 minutes):  Exercises per grid below to improve mobility, strength and coordination. Required minimal visual, verbal and manual cues to promote proper body alignment, promote proper body posture and promote proper body mechanics. Progressed resistance, range and repetitions as indicated. All exercises performed with emphasis on kegel and breathing in order improve coordination, awareness and to minimize symptoms. Date:  8-12-20 Date:  8-18-20 Date:     Activity/Exercise Parameters Parameters Parameters   Patient Education Discussed HEP and POC     Drops Reviewed for HEP     Breathing Reviewed for HEP     Figure 4  30 sec hold x 3      butterfly  30 sec hold x 3                     Pt gives verbal consent to internal  assessment/treatment no chaperon present. MANUAL THERAPY: (45 minutes) to improve soft tissue tone    Date Type Location Comments   9/28/2020 Internal assessment/treatment Rectal canal CTM, SP, traction to coccyx, drops                                         (Used abbreviations: MET - muscle energy technique; SCS- Strain counter strain; CTM-Connective tissue mobilizations; CR- Contract/relax; SP- Sustained pressure, TrP-Trigger point release, IASTM- Instrument assisted soft tissue mobilizations, TDN-Trigger point dry needling)        [facility] Portal     Treatment/Session Summary:  Pt reports good understanding of plan of care, as well as prescribed home exercise program.  All questions were answered to pt's satisfaction. Pt was invited to call with any further questions or concerns.   · Response to Treatment:  Patient had less tightness today and no soreness with manual. Continues to have good PFM control with defecation dynamics  · Communication/Consultation:  None today  · Equipment provided today:  None today  · Recommendations/Intent for next treatment session: Next visit will focus on manual therapy, defecation dynamics, stretches.   Total Treatment Billable Duration:   45 minutes manual  PT Patient Time In/Time Out  Time In: 0800  Time Out: 727 [facility], DPT    Future Appointments   Date Time Provider Delmar Green   10/1/2020  8:00 AM Millie Cole DPT [facility]

## 2020-10-01 ENCOUNTER — HOSPITAL ENCOUNTER (OUTPATIENT)
Dept: PHYSICAL THERAPY | Age: 60
Discharge: HOME | End: 2020-10-01
Payer: MEDICARE

## 2020-10-01 ENCOUNTER — APPOINTMENT (OUTPATIENT)
Dept: PHYSICAL THERAPY | Age: 60
End: 2020-10-01
Payer: MEDICARE

## 2020-10-01 PROCEDURE — 97140 MANUAL THERAPY 1/> REGIONS: CPT

## 2020-10-01 NOTE — PROGRESS NOTES
Aimee Young  : 1960  [place]  Secondary:  [address]  at [facility]  [address], [address], [place] [address]  [phone]   [phone]      OUTPATIENT PHYSICAL THERAPY: Daily Treatment Note 10/1/2020  ICD-10: Treatment Diagnosis: K59.02 Outlet dysfunction constipation, R27.8 Lack of coordination (muscle incoordination), M62.83 Muscle spasm    Precautions/Allergies:   Patient has no allergy information on record. TREATMENT PLAN:  Effective Dates: 10-1-20 to 20 (90 days). Frequency/Duration: Follow 2 time a week for 6 weeks MEDICAL/REFERRING DIAGNOSIS:  Outlet dysfunction constipation [K59.02]   DATE OF ONSET: 3 years  REFERRING PHYSICIAN: Jonnathan Bowling MD MD Orders: evaluate and treat  Return MD Appointment: 2020     Pre-treatment Symptoms/Complaints:  Patient reports he had a bad day yesterday and took two shots of Miralax. He ate lasagna. Pain: Initial:   0 Post Session:  0/10   Medications Last Reviewed:  10/1/2020   Updated Objective Findings:  see below   Pt is a 57 yo male referred to pelvic floor physical therapy (PFPT) 2/2 outlet dysfunction by Jonnathan Bowling MD.  Patient reports his colon closed up after his heart surgery (3 years ago). It took him a while for an MD to listen to him. The pain is gone from his colon but he has trouble pooping. He has to make it liquid in order to poop. He takes dulcolax and prune juice. Miralax doesn't make it liquid anymore. Was doing physical therapy in [facility] for pelvic floor but they did not do any internal. The drops have helped. He has to go to bed at 6 at night because he will have to poop all night.      Urinary: Frequency 5-9 x/day, 0 x/night. Positive for none.  Negative for urinary frequency, incomplete emptying and urinary hesitancy/intermittency, stress urinary incontinence, urge urinary incontinence, urinary urgency, dysuria, hematuria, nocturia and nocturnal enuresis. Pt does not use pads for protection; 0 pads per day (PPD). Fluid intake: 3-4 bottles  water/day; bladder irritants include: decaf tea, coffee in am  Bowel: Frequency every 12 hours (2-3 hours to get it out). Positive for pain with bowel movement, pushing/straining with bowel movement, constipation and incomplete emptying. Negative for fecal incontinence. Pt does not use pads for protection; 0 pads per day (PPD). Use of stool softeners or laxatives? YES (Miralax)  Sexual: Pt is sexually active with female partners. negative for dyspareunia. History of sexual abuse:  NO:          Pelvic Pain: varies, more rectal pain  External Observations:  · Voluntary contraction: []? absent     [x]? present  · Skin integrity: intact     Pelvic Floor Muscle (PFM) Assessment:     · Muscle volume: []? decreased     [x]? WNL     []? Defect  · PFM tension: []? decreased     [x]? increased     []? WNL     Contraction ability:  Voluntary contraction: []? absent     []? weak     [x]? moderate      []? strong  Voluntary relaxation: []? absent     [x]? partial     []? complete   MMT: 3/5   Overflow: []? absent     [x]? min     []? mod     []? severe / Compensatory mm groups include abomindals           Palpation: via rectal canal   Superficial Pelvic Floor Musculature (PFM): Tender? Intermediate PFM Tender? Deep PFM Tender? Superficial Transverse Perineal []? Right  []? Left  [x]? N/T Deep Transverse Perineal []? Right  []? Left  [x]? N/T Puborectalis [x]? Right  [x]? Left  []? N/T   Ischiocavernosus []? Right  []? Left  [x]? N/T Compressor Urethra []? Right  []? Left  [x]? N/T Pubococcygeus [x]? Right  [x]? Left  []? N/T   Bulbocavernosus []? Right  []? Left  [x]? N/T     Ileococcygeus [x]? Right  [x]? Left  []? N/T           Obturator Internus [x]? Right  [x]? Left  []? N/T           Coccygeus [x]? Right  [x]? Left  []? N/T         Breath assessment:  Coordination of pelvic floor muscles with breath: moderate pelvic floor muscle excursion     TREATMENT:   THERAPEUTIC EXERCISE: (0 minutes):  Exercises per grid below to improve mobility, strength and coordination. Required minimal visual, verbal and manual cues to promote proper body alignment, promote proper body posture and promote proper body mechanics. Progressed resistance, range and repetitions as indicated. All exercises performed with emphasis on kegel and breathing in order improve coordination, awareness and to minimize symptoms. Date:  8-12-20 Date:  8-18-20 Date:     Activity/Exercise Parameters Parameters Parameters   Patient Education Discussed HEP and POC     Drops Reviewed for HEP     Breathing Reviewed for HEP     Figure 4  30 sec hold x 3      butterfly  30 sec hold x 3                     Pt gives verbal consent to internal  assessment/treatment no chaperon present. MANUAL THERAPY: (45 minutes) to improve soft tissue tone    Date Type Location Comments   10/1/2020 Internal assessment/treatment Rectal canal CTM, SP, traction to coccyx, drops                                         (Used abbreviations: MET - muscle energy technique; SCS- Strain counter strain; CTM-Connective tissue mobilizations; CR- Contract/relax; SP- Sustained pressure, TrP-Trigger point release, IASTM- Instrument assisted soft tissue mobilizations, TDN-Trigger point dry needling)        [facility] Portal     Treatment/Session Summary:  Pt reports good understanding of plan of care, as well as prescribed home exercise program.  All questions were answered to pt's satisfaction. Pt was invited to call with any further questions or concerns. · Response to Treatment:  Patient see recert  · Communication/Consultation:  None today  · Equipment provided today:  None today  · Recommendations/Intent for next treatment session: Next visit will focus on manual therapy, defecation dynamics, stretches.   Total Treatment Billable Duration:   45 minutes manual  PT Patient Time In/Time Out  Time In: 0800  Time Out: [address], DPT    Future Appointments   Date Time Provider Delmar Green   10/5/2020  8:00 AM Mai Aj, DPT [facility]   10/12/2020  8:00 AM Mai Aj, DPT [facility]   10/19/2020  8:00 AM Mai Aj, DPT [facility]   10/26/2020  8:00 AM Mai Aj, DPT [facility]   11/2/2020  8:00 AM Mai Aj, DPT [facility]   11/5/2020  8:00 AM Mai Aj, DPT [facility]   11/9/2020  8:00 AM Mai Aj, DPT [facility]   11/12/2020  8:00 AM Mai Aj, DPT [facility]   11/16/2020  8:00 AM Mai Aj, DPT [facility]   11/19/2020  8:00 AM Mai Aj, DPT [facility]   11/23/2020  8:00 AM Mai Aj, DPT [facility]   11/25/2020  8:00 AM Mai Aj, DPT [facility]   11/30/2020  8:00 AM Mai Aj, DPT [facility]   12/3/2020  8:00 AM Mai Aj, DPT [facility]

## 2020-10-01 NOTE — THERAPY EVALUATION
Julio Davis : 1960 [place] Secondary:  [facility] [address], [place] [address] [phone]   [phone] OUTPATIENT PHYSICAL THERAPY:Recertification  ICD-10: Treatment Diagnosis: K59.02 Outlet dysfunction constipation, R27.8 Lack of coordination (muscle incoordination), M62.83 Muscle spasm Precautions/Allergies:  
Patient has no allergy information on record. TREATMENT PLAN: 
Effective Dates: 10-1-20 TO 2020 (90 days). Frequency/Duration: Follow 2 time a week for 6 weeks MEDICAL/REFERRING DIAGNOSIS: 
Outlet dysfunction constipation [K59.02] DATE OF ONSET: 3 years REFERRING PHYSICIAN: Mily Haddad MD MD Orders: evaluate and treat Return MD Appointment: 2020 REASSESSMENT: Mr. Lala Choudhary has been seen in skilled PT from 20 to 10-1-20. Patient has attended 10 out of 10 scheduled visits, with 0 cancellation(s) and 0 no shows. Treatment has emphasized manual therapy, toilet mechanics, down training. Patient has made improvements in ability to have a soft bowel movement without pushing straining and has cut back on his dulcolax, however continues to demonstrate deficits in tightness of pelvic floor muslces contributing to inability to evacuate bowels completely. Pt has progressed with goals as indicated below at this point and will continue to benefit from skilled PT in order to achieve remaining goals and maximize functional outcomes in order to return to [facility]. INITIAL ASSESSMENT:  Mr. Lala Choudhary presents with over activity and impaired coordination of pelvic floor muscles. He has a complex past medical history of bowel dysfunction. He is unable to coordinate a pelvic floor muscle contraction or relaxation. He required verbal cuing for proper activation and isolation of pelvic floor muscles.   He has pain with palpation to the coccyx and PFM via rectal examination. He will continue to benefit from skilled physical therapy to address above mentioned deficits and restore normal PFM function post operatively to minimize urinary incontinence PROBLEM LIST (Impacting functional limitations): 1. Decreased Flexibility/Joint Mobility 2. Decreased coordination 3. Decreased strength of PFM INTERVENTIONS PLANNED: 
1. Neuromuscular re-education 2. Biofeedback as needed 3. HEP 4. Bowel retraining 5. Bowelducation 6. Therapeutic Activites 7. Therapeutic Exercise/Strengthening 8. Bladder training and education GOALS: (Goals have been discussed and agreed upon with patient.) Short Term Goals: (3 weeks) 1. Pt will demonstrate understanding of and ability to teach back appropriate water and fiber intake, toileting frequency, and positioning for improved self-management of symptoms. MET 2. Pt will demonstrate understanding of and ability to teach back two strategies to reduce constipation and improve toileting to minimize strain on the pelvic floor muscles. MET 3. Pt will demonstrate ability to perform diaphragmatic breathing in multiple positions in order to assist with pelvic floor lengthening and reduced discomfort to aid in bowel evacuation. MET 4. Pt will engage in 30 minutes of physical activity per day in order to assist in digestion and ease of passing stools. Long Term Goals: (6 weeks) 1. Pt will demonstrate ability to increase intra-abdominal pressure and eccentrically contract the pelvic floor muscles without breath holding, as assessed by digitally or with use of sEMG biofeedback. 2. Pt will report minimal to no pain upon examination of the levator ani muscles and report minimal or no discomfort with bowel evacuation. 3. Pt will report being able to wean from taking stool softeners and laxatives to have a bowel movement Urinary: Frequency 5-9 x/day, 0 x/night. Positive for none.  Negative for urinary frequency, incomplete emptying and urinary hesitancy/intermittency, stress urinary incontinence, urge urinary incontinence, urinary urgency, dysuria, hematuria, nocturia and nocturnal enuresis. Pt does not use pads for protection; 0 pads per day (PPD). Fluid intake: 3-4 bottles  water/day; bladder irritants include: decaf tea, coffee in am 
Bowel: Frequency every 12 hours (2-3 hours to get it out). Positive for pain with bowel movement, pushing/straining with bowel movement, constipation and incomplete emptying. Negative for fecal incontinence. Pt does not use pads for protection; 0 pads per day (PPD). Use of stool softeners or laxatives? YES (Miralax) Sexual: Pt is sexually active with female partners. negative for dyspareunia. History of sexual abuse:  NO:  
       Pelvic Pain: varies, more rectal pain External Observations: · Voluntary contraction: []? absent     [x]? present · Skin integrity: intact 
  
Pelvic Floor Muscle (PFM) Assessment: 
  
· Muscle volume: []? decreased     [x]? WNL     []? Defect · PFM tension: []? decreased     [x]? increased     []? WNL 
  
Contraction ability: 
Voluntary contraction: []? absent     []? weak     [x]? moderate      []? strong Voluntary relaxation: []? absent     [x]? partial     []? complete MMT: 3/5 Overflow: []? absent     [x]? min     []? mod     []? severe / Compensatory mm groups include abomindals 
  
  
  
Palpation: via rectal canal  
Superficial Pelvic Floor Musculature (PFM): Tender? Intermediate PFM Tender? Deep PFM Tender? Superficial Transverse Perineal []? Right  []? Left  [x]? N/T Deep Transverse Perineal []? Right  []? Left  [x]? N/T Puborectalis [x]? Right  [x]? Left  []? N/T Ischiocavernosus []? Right  []? Left  [x]? N/T Compressor Urethra []? Right  []? Left  [x]? N/T Pubococcygeus [x]? Right  [x]? Left  []? N/T Bulbocavernosus []? Right  []? Left  [x]? N/T     Ileococcygeus [x]? Right  [x]? Left  []? N/T  
         Obturator Internus [x]? Right  [x]? Left  []? N/T  
        Coccygeus [x]? Right  [x]? Left  []? N/T  
  
  
Breath assessment: 
Coordination of pelvic floor muscles with breath: moderate pelvic floor muscle excursion Outcome Measure: NIH- Chronic Prostatitis Symptom Index (NIH-CPSI) for Males Initial:  Most Recent:   
Pain 0 Urinary Symptoms 0   
QOL Index 0 Interpretation of Score: The NIH-CPSI has a total score range from 0 to 43, and it includes three subscales addressing pain (score range 021), urinary symptoms (score range 010), and quality of life (QOL) (score range 012). CONSTIPATION SCORING SYSTEM:  
17/30 Medical Necessity:  
· Patient is expected to demonstrate progress in  coordination and functional technique to minimize bowel dysfunctions. Reason for Services/Other Comments: 
· Patient continues to require skilled intervention due to above mentioned deficits. Total Duration: PT Patient Time In/Time Out Time In: 0800 Time Out: 4750 Rehabilitation Potential For Stated Goals: Holly Baldwin Regarding [facility] [place] therapy, I certify that the treatment plan above will be carried out by a therapist or under their direction. Thank you for this referral, 
Terrnace Woodard DPT Referring Physician Signature: Jonnathan Bowling MD              Date

## 2020-10-05 ENCOUNTER — HOSPITAL ENCOUNTER (OUTPATIENT)
Dept: PHYSICAL THERAPY | Age: 60
Discharge: HOME | End: 2020-10-05
Payer: MEDICARE

## 2020-10-05 PROCEDURE — 97140 MANUAL THERAPY 1/> REGIONS: CPT

## 2020-10-05 NOTE — PROGRESS NOTES
Jacques Domínguez  : 1960  [place]  Secondary:  [address]  at [facility]  [address], [address], [place] [address]  [phone]   [phone]      OUTPATIENT PHYSICAL THERAPY: Daily Treatment Note 10/5/2020  ICD-10: Treatment Diagnosis: K59.02 Outlet dysfunction constipation, R27.8 Lack of coordination (muscle incoordination), M62.83 Muscle spasm    Precautions/Allergies:   Patient has no allergy information on record. TREATMENT PLAN:  Effective Dates: 10-1-20 to 20 (90 days). Frequency/Duration: Follow 2 time a week for 6 weeks MEDICAL/REFERRING DIAGNOSIS:  Outlet dysfunction constipation [K59.02]   DATE OF ONSET: 3 years  REFERRING PHYSICIAN: Kavitha Davis MD MD Orders: evaluate and treat  Return MD Appointment:2020     Pre-treatment Symptoms/Complaints:  Patient reports Friday and Saturday was excellent.  was a little worse but not bad. Still had to take his miralax and prune juice to make it pudding. Pain: Initial:   0 Post Session:  0/10   Medications Last Reviewed:  10/5/2020   Updated Objective Findings:  see below   Pt is a 55 yo male referred to pelvic floor physical therapy (PFPT) 2/2 outlet dysfunction by Kavitha Davis MD.  Patient reports his colon closed up after his heart surgery (3 years ago). It took him a while for an MD to listen to him. The pain is gone from his colon but he has trouble pooping. He has to make it liquid in order to poop. He takes dulcolax and prune juice. Miralax doesn't make it liquid anymore. Was doing physical therapy in [facility] for pelvic floor but they did not do any internal. The drops have helped. He has to go to bed at 6 at night because he will have to poop all night.      Urinary: Frequency 5-9 x/day, 0 x/night. Positive for none.  Negative for urinary frequency, incomplete emptying and urinary hesitancy/intermittency, stress urinary incontinence, urge urinary incontinence, urinary urgency, dysuria, hematuria, nocturia and nocturnal enuresis. Pt does not use pads for protection; 0 pads per day (PPD). Fluid intake: 3-4 bottles  water/day; bladder irritants include: decaf tea, coffee in am  Bowel: Frequency every 12 hours (2-3 hours to get it out). Positive for pain with bowel movement, pushing/straining with bowel movement, constipation and incomplete emptying. Negative for fecal incontinence. Pt does not use pads for protection; 0 pads per day (PPD). Use of stool softeners or laxatives? YES (Miralax)  Sexual: Pt is sexually active with female partners. negative for dyspareunia. History of sexual abuse:  NO:          Pelvic Pain: varies, more rectal pain  External Observations:  · Voluntary contraction: []? absent     [x]? present  · Skin integrity: intact     Pelvic Floor Muscle (PFM) Assessment:     · Muscle volume: []? decreased     [x]? WNL     []? Defect  · PFM tension: []? decreased     [x]? increased     []? WNL     Contraction ability:  Voluntary contraction: []? absent     []? weak     [x]? moderate      []? strong  Voluntary relaxation: []? absent     [x]? partial     []? complete   MMT: 3/5   Overflow: []? absent     [x]? min     []? mod     []? severe / Compensatory mm groups include abomindals           Palpation: via rectal canal   Superficial Pelvic Floor Musculature (PFM): Tender? Intermediate PFM Tender? Deep PFM Tender? Superficial Transverse Perineal []? Right  []? Left  [x]? N/T Deep Transverse Perineal []? Right  []? Left  [x]? N/T Puborectalis [x]? Right  [x]? Left  []? N/T   Ischiocavernosus []? Right  []? Left  [x]? N/T Compressor Urethra []? Right  []? Left  [x]? N/T Pubococcygeus [x]? Right  [x]? Left  []? N/T   Bulbocavernosus []? Right  []? Left  [x]? N/T     Ileococcygeus [x]? Right  [x]? Left  []? N/T           Obturator Internus [x]? Right  [x]? Left  []? N/T           Coccygeus [x]? Right  [x]? Left  []? N/T         Breath assessment:  Coordination of pelvic floor muscles with breath: moderate pelvic floor muscle excursion     TREATMENT:   THERAPEUTIC EXERCISE: (0 minutes):  Exercises per grid below to improve mobility, strength and coordination. Required minimal visual, verbal and manual cues to promote proper body alignment, promote proper body posture and promote proper body mechanics. Progressed resistance, range and repetitions as indicated. All exercises performed with emphasis on kegel and breathing in order improve coordination, awareness and to minimize symptoms. Date:  8-12-20 Date:  8-18-20 Date:     Activity/Exercise Parameters Parameters Parameters   Patient Education Discussed HEP and POC     Drops Reviewed for HEP     Breathing Reviewed for HEP     Figure 4  30 sec hold x 3      butterfly  30 sec hold x 3                     Pt gives verbal consent to internal  assessment/treatment no chaperon present. MANUAL THERAPY: (45 minutes) to improve soft tissue tone    Date Type Location Comments   10/5/2020 Internal assessment/treatment Rectal canal CTM, SP, traction to coccyx, drops                                         (Used abbreviations: MET - muscle energy technique; SCS- Strain counter strain; CTM-Connective tissue mobilizations; CR- Contract/relax; SP- Sustained pressure, TrP-Trigger point release, IASTM- Instrument assisted soft tissue mobilizations, TDN-Trigger point dry needling)        [facility] Portal     Treatment/Session Summary:  Pt reports good understanding of plan of care, as well as prescribed home exercise program.  All questions were answered to pt's satisfaction. Pt was invited to call with any further questions or concerns. · Response to Treatment:  Patient with less tightness today.  Educated patient on keeping a bowel/food diary for the next few days  · Communication/Consultation:  None today  · Equipment provided today:  None today  · Recommendations/Intent for next treatment session: Next visit will focus on manual therapy, defecation dynamics, stretches.   Total Treatment Billable Duration:   45 minutes manual  PT Patient Time In/Time Out  Time In: 0800  Time Out: [facility], DPT    Future Appointments   Date Time Provider Del[place] Green   10/8/2020  7:30 AM Gretel Redder, DPT [facility]   10/12/2020  8:00 AM Gretel Redder, DPT [facility]   10/15/2020  7:30 AM Gretel Redder, DPT [facility]   10/19/2020  8:00 AM Gretel Redder, DPT [facility]   10/22/2020  7:30 AM Gretel Redder, DPT [facility]   10/26/2020  8:00 AM Gretel Redder, DPT [facility]   10/29/2020  7:30 AM Gretel Redder, DPT [facility]   11/2/2020  8:00 AM Gretel Redder, DPT [facility]   11/5/2020  8:00 AM Gretel Redder, DPT [facility]   11/9/2020  8:00 AM Gretel Redder, DPT [facility]   11/12/2020  8:00 AM Gretel Redder, DPT [facility]   11/16/2020  8:00 AM Gretel Redder, DPT [facility]   11/19/2020  8:00 AM Gretel Redder, DPT [facility]   11/23/2020  8:00 AM Gretel Redder, DPT [facility]   11/25/2020  8:00 AM Gretel Redder, DPT [facility]   11/30/2020  8:00 AM Gretel Redder, DPT [facility]   12/3/2020  8:00 AM Gretel Redder, DPT [facility]

## 2020-10-08 ENCOUNTER — HOSPITAL ENCOUNTER (OUTPATIENT)
Dept: PHYSICAL THERAPY | Age: 60
Discharge: HOME | End: 2020-10-08
Payer: MEDICARE

## 2020-10-08 PROCEDURE — 97140 MANUAL THERAPY 1/> REGIONS: CPT

## 2020-10-08 NOTE — PROGRESS NOTES
Gemma Ellis  : 1960  [place]  Secondary:  [address]  at [facility]  [address], [address], [place] [address]  [phone]   [phone]      OUTPATIENT PHYSICAL THERAPY: Daily Treatment Note 10/8/2020  ICD-10: Treatment Diagnosis: K59.02 Outlet dysfunction constipation, R27.8 Lack of coordination (muscle incoordination), M62.83 Muscle spasm    Precautions/Allergies:   Patient has no allergy information on record. TREATMENT PLAN:  Effective Dates: 10-1-20 to 20 (90 days). Frequency/Duration: Follow 2 time a week for 6 weeks MEDICAL/REFERRING DIAGNOSIS:  Outlet dysfunction constipation [K59.02]   DATE OF ONSET: 3 years  REFERRING PHYSICIAN: Armando Stovall MD MD Orders: evaluate and treat  Return MD Appointment:2020     Pre-treatment Symptoms/Complaints:  Patient reports Monday and Tuesday were good days and Wednesday was a bad day. Had to take the miralax twice yesterday. Pain: Initial:   0 Post Session:  0/10   Medications Last Reviewed:  10/8/2020   Updated Objective Findings:  see below   Pt is a 57 yo male referred to pelvic floor physical therapy (PFPT) 2/2 outlet dysfunction by Armando Stovall MD.  Patient reports his colon closed up after his heart surgery (3 years ago). It took him a while for an MD to listen to him. The pain is gone from his colon but he has trouble pooping. He has to make it liquid in order to poop. He takes dulcolax and prune juice. Miralax doesn't make it liquid anymore. Was doing physical therapy in [facility] for pelvic floor but they did not do any internal. The drops have helped. He has to go to bed at 6 at night because he will have to poop all night.      Urinary: Frequency 5-9 x/day, 0 x/night. Positive for none.  Negative for urinary frequency, incomplete emptying and urinary hesitancy/intermittency, stress urinary incontinence, urge urinary incontinence, urinary urgency, dysuria, hematuria, nocturia and nocturnal enuresis. Pt does not use pads for protection; 0 pads per day (PPD). Fluid intake: 3-4 bottles  water/day; bladder irritants include: decaf tea, coffee in am  Bowel: Frequency every 12 hours (2-3 hours to get it out). Positive for pain with bowel movement, pushing/straining with bowel movement, constipation and incomplete emptying. Negative for fecal incontinence. Pt does not use pads for protection; 0 pads per day (PPD). Use of stool softeners or laxatives? YES (Miralax)  Sexual: Pt is sexually active with female partners. negative for dyspareunia. History of sexual abuse:  NO:          Pelvic Pain: varies, more rectal pain  External Observations:  · Voluntary contraction: []? absent     [x]? present  · Skin integrity: intact     Pelvic Floor Muscle (PFM) Assessment:     · Muscle volume: []? decreased     [x]? WNL     []? Defect  · PFM tension: []? decreased     [x]? increased     []? WNL     Contraction ability:  Voluntary contraction: []? absent     []? weak     [x]? moderate      []? strong  Voluntary relaxation: []? absent     [x]? partial     []? complete   MMT: 3/5   Overflow: []? absent     [x]? min     []? mod     []? severe / Compensatory mm groups include abomindals           Palpation: via rectal canal   Superficial Pelvic Floor Musculature (PFM): Tender? Intermediate PFM Tender? Deep PFM Tender? Superficial Transverse Perineal []? Right  []? Left  [x]? N/T Deep Transverse Perineal []? Right  []? Left  [x]? N/T Puborectalis [x]? Right  [x]? Left  []? N/T   Ischiocavernosus []? Right  []? Left  [x]? N/T Compressor Urethra []? Right  []? Left  [x]? N/T Pubococcygeus [x]? Right  [x]? Left  []? N/T   Bulbocavernosus []? Right  []? Left  [x]? N/T     Ileococcygeus [x]? Right  [x]? Left  []? N/T           Obturator Internus [x]? Right  [x]? Left  []? N/T           Coccygeus [x]? Right  [x]? Left  []? N/T         Breath assessment:  Coordination of pelvic floor muscles with breath: moderate pelvic floor muscle excursion     TREATMENT:   THERAPEUTIC EXERCISE: (0 minutes):  Exercises per grid below to improve mobility, strength and coordination. Required minimal visual, verbal and manual cues to promote proper body alignment, promote proper body posture and promote proper body mechanics. Progressed resistance, range and repetitions as indicated. All exercises performed with emphasis on kegel and breathing in order improve coordination, awareness and to minimize symptoms. Date:  8-12-20 Date:  8-18-20 Date:     Activity/Exercise Parameters Parameters Parameters   Patient Education Discussed HEP and POC     Drops Reviewed for HEP     Breathing Reviewed for HEP     Figure 4  30 sec hold x 3      butterfly  30 sec hold x 3                     Pt gives verbal consent to internal  assessment/treatment no chaperon present. MANUAL THERAPY: (30 minutes) to improve soft tissue tone    Date Type Location Comments   10/8/2020 Internal assessment/treatment Rectal canal CTM, SP, traction to coccyx, drops                                         (Used abbreviations: MET - muscle energy technique; SCS- Strain counter strain; CTM-Connective tissue mobilizations; CR- Contract/relax; SP- Sustained pressure, TrP-Trigger point release, IASTM- Instrument assisted soft tissue mobilizations, TDN-Trigger point dry needling)        [facility] Portal     Treatment/Session Summary:  Pt reports good understanding of plan of care, as well as prescribed home exercise program.  All questions were answered to pt's satisfaction. Pt was invited to call with any further questions or concerns. · Response to Treatment:  Patient had some soreness near coccyx today. Continues to demonstrate improvements in muscle tone and coordination.    · Communication/Consultation:  None today  · Equipment provided today:  None today  · Recommendations/Intent for next treatment session: Next visit will focus on manual therapy, defecation dynamics, stretches.   Total Treatment Billable Duration:   30 minutes manual  PT Patient Time In/Time Out  Time In: 0730  Time Out: 0800  Mar Dominguez, CHARLES    Future Appointments   Date Time Provider [place]mar Green   10/12/2020  8:00 AM Holly Aquas, DPT [facility]   10/15/2020  7:30 AM Holly Aquas, DPT [facility]   10/19/2020  8:00 AM Holly Aquas, DPT [facility]   10/22/2020  7:30 AM Holly Aquas, DPT [facility]   10/26/2020  8:00 AM Holly Aquas, DPT [facility]   10/29/2020  7:30 AM Holly Aquas, DPT [facility]   11/2/2020  8:00 AM Holly Aquas, DPT [facility]   11/5/2020  8:00 AM Holly Aquas, DPT [facility]   11/9/2020  8:00 AM Holly Aquas, DPT [facility]   11/12/2020  8:00 AM Holly Aquas, DPT [facility]   11/16/2020  8:00 AM Holly Aquas, DPT [facility]   11/19/2020  8:00 AM Holly Aquas, DPT [facility]   11/23/2020  8:00 AM Holly Aquas, DPT [facility]   11/25/2020  8:00 AM Holly Aquas, DPT [facility]   11/30/2020  8:00 AM Holly Aquas, DPT [facility]   12/3/2020  8:00 AM Holly Aquas, DPT [facility]

## 2020-10-15 ENCOUNTER — HOSPITAL ENCOUNTER (OUTPATIENT)
Dept: PHYSICAL THERAPY | Age: 60
Discharge: HOME | End: 2020-10-15
Payer: MEDICARE

## 2020-10-15 PROCEDURE — 97140 MANUAL THERAPY 1/> REGIONS: CPT

## 2020-10-15 NOTE — PROGRESS NOTES
Sindy Anne  : 1960  [place]  Secondary:  [address]  at [facility]  [address], [address], [place]  [phone]   [phone]      OUTPATIENT PHYSICAL THERAPY: Daily Treatment Note 10/15/2020  ICD-10: Treatment Diagnosis: K59.02 Outlet dysfunction constipation, R27.8 Lack of coordination (muscle incoordination), M62.83 Muscle spasm    Precautions/Allergies:   Patient has no allergy information on record. TREATMENT PLAN:  Effective Dates: 10-1-20 to 20 (90 days). Frequency/Duration: Follow 2 time a week for 6 weeks MEDICAL/REFERRING DIAGNOSIS:  Outlet dysfunction constipation [K59.02]   DATE OF ONSET: 3 years  REFERRING PHYSICIAN: Merlinda Grand, MD MD Orders: evaluate and treat  Return MD Appointment:2020     Pre-treatment Symptoms/Complaints:  Patient reports things are about the same. He can tell that he missed a day. He took ducolax this morning. Pain: Initial:   0 Post Session:  0/10   Medications Last Reviewed:  10/15/2020   Updated Objective Findings:  see below   Pt is a 57 yo male referred to pelvic floor physical therapy (PFPT) 2/2 outlet dysfunction by Merlinda Grand, MD.  Patient reports his colon closed up after his heart surgery (3 years ago). It took him a while for an MD to listen to him. The pain is gone from his colon but he has trouble pooping. He has to make it liquid in order to poop. He takes dulcolax and prune juice. Miralax doesn't make it liquid anymore. Was doing physical therapy in [facility] for pelvic floor but they did not do any internal. The drops have helped. He has to go to bed at 6 at night because he will have to poop all night.      Urinary: Frequency 5-9 x/day, 0 x/night. Positive for none.  Negative for urinary frequency, incomplete emptying and urinary hesitancy/intermittency, stress urinary incontinence, urge urinary incontinence, urinary urgency, dysuria, hematuria, nocturia and nocturnal enuresis. Pt does not use pads for protection; 0 pads per day (PPD). Fluid intake: 3-4 bottles  water/day; bladder irritants include: decaf tea, coffee in am  Bowel: Frequency every 12 hours (2-3 hours to get it out). Positive for pain with bowel movement, pushing/straining with bowel movement, constipation and incomplete emptying. Negative for fecal incontinence. Pt does not use pads for protection; 0 pads per day (PPD). Use of stool softeners or laxatives? YES (Miralax)  Sexual: Pt is sexually active with female partners. negative for dyspareunia. History of sexual abuse:  NO:          Pelvic Pain: varies, more rectal pain  External Observations:  · Voluntary contraction: []? absent     [x]? present  · Skin integrity: intact     Pelvic Floor Muscle (PFM) Assessment:     · Muscle volume: []? decreased     [x]? WNL     []? Defect  · PFM tension: []? decreased     [x]? increased     []? WNL     Contraction ability:  Voluntary contraction: []? absent     []? weak     [x]? moderate      []? strong  Voluntary relaxation: []? absent     [x]? partial     []? complete   MMT: 3/5   Overflow: []? absent     [x]? min     []? mod     []? severe / Compensatory mm groups include abomindals           Palpation: via rectal canal   Superficial Pelvic Floor Musculature (PFM): Tender? Intermediate PFM Tender? Deep PFM Tender? Superficial Transverse Perineal []? Right  []? Left  [x]? N/T Deep Transverse Perineal []? Right  []? Left  [x]? N/T Puborectalis [x]? Right  [x]? Left  []? N/T   Ischiocavernosus []? Right  []? Left  [x]? N/T Compressor Urethra []? Right  []? Left  [x]? N/T Pubococcygeus [x]? Right  [x]? Left  []? N/T   Bulbocavernosus []? Right  []? Left  [x]? N/T     Ileococcygeus [x]? Right  [x]? Left  []? N/T           Obturator Internus [x]? Right  [x]? Left  []? N/T           Coccygeus [x]? Right  [x]? Left  []? N/T         Breath assessment:  Coordination of pelvic floor muscles with breath: moderate pelvic floor muscle excursion     TREATMENT:   THERAPEUTIC EXERCISE: (0 minutes):  Exercises per grid below to improve mobility, strength and coordination. Required minimal visual, verbal and manual cues to promote proper body alignment, promote proper body posture and promote proper body mechanics. Progressed resistance, range and repetitions as indicated. All exercises performed with emphasis on kegel and breathing in order improve coordination, awareness and to minimize symptoms. Date:  8-12-20 Date:  8-18-20 Date:     Activity/Exercise Parameters Parameters Parameters   Patient Education Discussed HEP and POC     Drops Reviewed for HEP     Breathing Reviewed for HEP     Figure 4  30 sec hold x 3      butterfly  30 sec hold x 3                     Pt gives verbal consent to internal  assessment/treatment no chaperon present. MANUAL THERAPY: (30 minutes) to improve soft tissue tone    Date Type Location Comments   10/15/2020 Internal assessment/treatment Rectal canal CTM, SP, traction to coccyx, drops                                         (Used abbreviations: MET - muscle energy technique; SCS- Strain counter strain; CTM-Connective tissue mobilizations; CR- Contract/relax; SP- Sustained pressure, TrP-Trigger point release, IASTM- Instrument assisted soft tissue mobilizations, TDN-Trigger point dry needling)        [employer] Portal     Treatment/Session Summary:  Pt reports good understanding of plan of care, as well as prescribed home exercise program.  All questions were answered to pt's satisfaction. Pt was invited to call with any further questions or concerns. · Response to Treatment:  Patient had some increased tightness from missing mondays session but not bad. · Communication/Consultation:  None today  · Equipment provided today:  None today  · Recommendations/Intent for next treatment session: Next visit will focus on manual therapy, defecation dynamics, stretches.   Total Treatment Billable Duration:   30 minutes manual  PT Patient Time In/Time Out  Time In: 0730  Time Out: 0800  Marily Camacho, DPT    Future Appointments   Date Time Provider Delmar Green   10/19/2020  8:00 AM Reyna Xie, DPT [facility]   10/22/2020  7:30 AM Reyna Xie, DPT [facility]   10/26/2020  8:00 AM Reyna Xie, DPT [facility]   10/29/2020  7:30 AM Reyna Xie, DPT [facility]   11/2/2020  8:00 AM Reyna Xie, DPT [facility]   11/5/2020  8:00 AM Reyna Xie, DPT [facility]   11/9/2020  8:00 AM Reyna Xie, DPT [facility]   11/12/2020  8:00 AM Reyna Xie, DPT [facility]   11/16/2020  8:00 AM Reyna Xie, DPT [facility]   11/19/2020  8:00 AM Reyna Xie, DPT [facility]   11/23/2020  8:00 AM Reyna Xie, DPT [facility]   11/25/2020  8:00 AM Reyna Xie, DPT [facility]   11/30/2020  8:00 AM Reyna Xie, DPT [facility]   12/3/2020  8:00 AM Reyna Xie, DPT [facility]

## 2020-10-19 ENCOUNTER — HOSPITAL ENCOUNTER (OUTPATIENT)
Dept: PHYSICAL THERAPY | Age: 60
Discharge: HOME | End: 2020-10-19
Payer: MEDICARE

## 2020-10-19 PROCEDURE — 97140 MANUAL THERAPY 1/> REGIONS: CPT

## 2020-10-19 NOTE — PROGRESS NOTES
Bryce Mccormack  : 1960  [place]  Secondary:  [address]  at [facility]  [address], [address], [place] [address]  [phone]   [phone]      OUTPATIENT PHYSICAL THERAPY: Daily Treatment Note 10/19/2020  ICD-10: Treatment Diagnosis: K59.02 Outlet dysfunction constipation, R27.8 Lack of coordination (muscle incoordination), M62.83 Muscle spasm    Precautions/Allergies:   Patient has no allergy information on record. TREATMENT PLAN:  Effective Dates: 10-1-20 to 20 (90 days). Frequency/Duration: Follow 2 time a week for 6 weeks MEDICAL/REFERRING DIAGNOSIS:  Outlet dysfunction constipation [K59.02]   DATE OF ONSET: 3 years  REFERRING PHYSICIAN: Vicente Contreras MD MD Orders: evaluate and treat  Return MD Appointment:2020     Pre-treatment Symptoms/Complaints:  Patient reports he farted this weekend and he knew it was only going to be a fart. This happened two times on Friday. Is having to take ducolax every 3rd day. Pain: Initial:   0 Post Session:  0/10   Medications Last Reviewed:  10/19/2020   Updated Objective Findings:  see below   Pt is a 57 yo male referred to pelvic floor physical therapy (PFPT) 2/2 outlet dysfunction by Vicente Contreras MD.  Patient reports his colon closed up after his heart surgery (3 years ago). It took him a while for an MD to listen to him. The pain is gone from his colon but he has trouble pooping. He has to make it liquid in order to poop. He takes dulcolax and prune juice. Miralax doesn't make it liquid anymore. Was doing physical therapy in [facility] for pelvic floor but they did not do any internal. The drops have helped. He has to go to bed at 6 at night because he will have to poop all night.      Urinary: Frequency 5-9 x/day, 0 x/night. Positive for none.  Negative for urinary frequency, incomplete emptying and urinary hesitancy/intermittency, stress urinary incontinence, urge urinary incontinence, urinary urgency, dysuria, hematuria, nocturia and nocturnal enuresis. Pt does not use pads for protection; 0 pads per day (PPD). Fluid intake: 3-4 bottles  water/day; bladder irritants include: decaf tea, coffee in am  Bowel: Frequency every 12 hours (2-3 hours to get it out). Positive for pain with bowel movement, pushing/straining with bowel movement, constipation and incomplete emptying. Negative for fecal incontinence. Pt does not use pads for protection; 0 pads per day (PPD). Use of stool softeners or laxatives? YES (Miralax)  Sexual: Pt is sexually active with female partners. negative for dyspareunia. History of sexual abuse:  NO:          Pelvic Pain: varies, more rectal pain  External Observations:  · Voluntary contraction: []? absent     [x]? present  · Skin integrity: intact     Pelvic Floor Muscle (PFM) Assessment:     · Muscle volume: []? decreased     [x]? WNL     []? Defect  · PFM tension: []? decreased     [x]? increased     []? WNL     Contraction ability:  Voluntary contraction: []? absent     []? weak     [x]? moderate      []? strong  Voluntary relaxation: []? absent     [x]? partial     []? complete   MMT: 3/5   Overflow: []? absent     [x]? min     []? mod     []? severe / Compensatory mm groups include abomindals           Palpation: via rectal canal   Superficial Pelvic Floor Musculature (PFM): Tender? Intermediate PFM Tender? Deep PFM Tender? Superficial Transverse Perineal []? Right  []? Left  [x]? N/T Deep Transverse Perineal []? Right  []? Left  [x]? N/T Puborectalis [x]? Right  [x]? Left  []? N/T   Ischiocavernosus []? Right  []? Left  [x]? N/T Compressor Urethra []? Right  []? Left  [x]? N/T Pubococcygeus [x]? Right  [x]? Left  []? N/T   Bulbocavernosus []? Right  []? Left  [x]? N/T     Ileococcygeus [x]? Right  [x]? Left  []? N/T           Obturator Internus [x]? Right  [x]? Left  []? N/T           Coccygeus [x]? Right  [x]? Left  []? N/T         Breath assessment:  Coordination of pelvic floor muscles with breath: moderate pelvic floor muscle excursion     TREATMENT:   THERAPEUTIC EXERCISE: (0 minutes):  Exercises per grid below to improve mobility, strength and coordination. Required minimal visual, verbal and manual cues to promote proper body alignment, promote proper body posture and promote proper body mechanics. Progressed resistance, range and repetitions as indicated. All exercises performed with emphasis on kegel and breathing in order improve coordination, awareness and to minimize symptoms. Date:  8-12-20 Date:  8-18-20 Date:     Activity/Exercise Parameters Parameters Parameters   Patient Education Discussed HEP and POC     Drops Reviewed for HEP     Breathing Reviewed for HEP     Figure 4  30 sec hold x 3      butterfly  30 sec hold x 3                     Pt gives verbal consent to internal  assessment/treatment no chaperon present. MANUAL THERAPY: (45 minutes) to improve soft tissue tone    Date Type Location Comments   10/19/2020 Internal assessment/treatment Rectal canal CTM, SP, traction to coccyx, drops      Discussed urge suppression techniques starting at his \"twilight zone\"                                   (Used abbreviations: MET - muscle energy technique; SCS- Strain counter strain; CTM-Connective tissue mobilizations; CR- Contract/relax; SP- Sustained pressure, TrP-Trigger point release, IASTM- Instrument assisted soft tissue mobilizations, TDN-Trigger point dry needling)        [employer] Portal     Treatment/Session Summary:  Pt reports good understanding of plan of care, as well as prescribed home exercise program.  All questions were answered to pt's satisfaction. Pt was invited to call with any further questions or concerns. · Response to Treatment:  Patient and I had a discussion about seeing a nutritionist to help out with the dietary component of his therapy.  He did a bowel/bladder/food diary and it this point it is a very complex matter for me to sort out. I also discussed with him urge suppression because I don't feel that his bowels are full and ready to eliminate all these times a day. Tightness wise his pelvic floor is much better and he demonstrate good coordination. Will discuss on Thursday cutting him down to one time a week to see how he can manage. · Communication/Consultation:  None today  · Equipment provided today:  None today  · Recommendations/Intent for next treatment session: Next visit will focus on manual therapy, defecation dynamics, stretches.   Total Treatment Billable Duration:   45 minutes manual  PT Patient Time In/Time Out  Time In: 0800  Time Out: [facility], DPT    Future Appointments   Date Time Provider Delmar Green   10/22/2020  8:00 AM Lila Och, DPT [facility]   10/26/2020  8:00 AM Lila Och, DPT [facility]   10/29/2020  8:00 AM Lila Och, DPT [facility]   11/2/2020  8:00 AM Lila Och, DPT [facility]   11/5/2020  8:00 AM Leandroetta Och, DPT [facility]   11/9/2020  8:00 AM Leandroetta Och, DPT [facility]   11/12/2020  8:00 AM Leandroetta Och, DPT [facility]   11/16/2020  8:00 AM Bobetta Och, DPT [facility]   11/19/2020  8:00 AM Leandroetta Och, DPT [facility]   11/23/2020  8:00 AM Leandroetta Och, DPT [facility]   11/25/2020  8:00 AM Bobetta Och, DPT [facility]   11/30/2020  8:00 AM Bobetta Och, DPT [facility]   12/3/2020  8:00 AM Bobetta Och, DPT [facility]

## 2020-10-26 ENCOUNTER — HOSPITAL ENCOUNTER (OUTPATIENT)
Dept: PHYSICAL THERAPY | Age: 60
Discharge: HOME | End: 2020-10-26
Payer: MEDICARE

## 2020-10-26 PROCEDURE — 97140 MANUAL THERAPY 1/> REGIONS: CPT

## 2020-10-26 NOTE — PROGRESS NOTES
Yanci Galvan  : 1960  [place]  Secondary:  [address]  at [facility]  [address], [address], [place] [address]  [phone]   [phone]      OUTPATIENT PHYSICAL THERAPY: Daily Treatment Note 10/26/2020  ICD-10: Treatment Diagnosis: K59.02 Outlet dysfunction constipation, R27.8 Lack of coordination (muscle incoordination), M62.83 Muscle spasm    Precautions/Allergies:   Patient has no allergy information on record. TREATMENT PLAN:  Effective Dates: 10-1-20 to 20 (90 days). Frequency/Duration: Follow 2 time a week for 6 weeks MEDICAL/REFERRING DIAGNOSIS:  Outlet dysfunction constipation [K59.02]   DATE OF ONSET: 3 years  REFERRING PHYSICIAN: Ludmila Arreaga MD MD Orders: evaluate and treat  Return MD Appointment:2020     Pre-treatment Symptoms/Complaints:  Patient reports the alcohol tore up his bowels. Pain: Initial:   0 Post Session:  0/10   Medications Last Reviewed:  10/26/2020   Updated Objective Findings:  see below   Pt is a 55 yo male referred to pelvic floor physical therapy (PFPT) 2/2 outlet dysfunction by uLdmila Arreaga MD.  Patient reports his colon closed up after his heart surgery (3 years ago). It took him a while for an MD to listen to him. The pain is gone from his colon but he has trouble pooping. He has to make it liquid in order to poop. He takes dulcolax and prune juice. Miralax doesn't make it liquid anymore. Was doing physical therapy in [facility] for pelvic floor but they did not do any internal. The drops have helped. He has to go to bed at 6 at night because he will have to poop all night.      Urinary: Frequency 5-9 x/day, 0 x/night. Positive for none. Negative for urinary frequency, incomplete emptying and urinary hesitancy/intermittency, stress urinary incontinence, urge urinary incontinence, urinary urgency, dysuria, hematuria, nocturia and nocturnal enuresis.  Pt does not use pads for protection; 0 pads per day (PPD). Fluid intake: 3-4 bottles  water/day; bladder irritants include: decaf tea, coffee in am  Bowel: Frequency every 12 hours (2-3 hours to get it out). Positive for pain with bowel movement, pushing/straining with bowel movement, constipation and incomplete emptying. Negative for fecal incontinence. Pt does not use pads for protection; 0 pads per day (PPD). Use of stool softeners or laxatives? YES (Miralax)  Sexual: Pt is sexually active with female partners. negative for dyspareunia. History of sexual abuse:  NO:          Pelvic Pain: varies, more rectal pain  External Observations:  · Voluntary contraction: []? absent     [x]? present  · Skin integrity: intact     Pelvic Floor Muscle (PFM) Assessment:     · Muscle volume: []? decreased     [x]? WNL     []? Defect  · PFM tension: []? decreased     [x]? increased     []? WNL     Contraction ability:  Voluntary contraction: []? absent     []? weak     [x]? moderate      []? strong  Voluntary relaxation: []? absent     [x]? partial     []? complete   MMT: 3/5   Overflow: []? absent     [x]? min     []? mod     []? severe / Compensatory mm groups include abomindals           Palpation: via rectal canal   Superficial Pelvic Floor Musculature (PFM): Tender? Intermediate PFM Tender? Deep PFM Tender? Superficial Transverse Perineal []? Right  []? Left  [x]? N/T Deep Transverse Perineal []? Right  []? Left  [x]? N/T Puborectalis [x]? Right  [x]? Left  []? N/T   Ischiocavernosus []? Right  []? Left  [x]? N/T Compressor Urethra []? Right  []? Left  [x]? N/T Pubococcygeus [x]? Right  [x]? Left  []? N/T   Bulbocavernosus []? Right  []? Left  [x]? N/T     Ileococcygeus [x]? Right  [x]? Left  []? N/T           Obturator Internus [x]? Right  [x]? Left  []? N/T           Coccygeus [x]? Right  [x]? Left  []? N/T         Breath assessment:  Coordination of pelvic floor muscles with breath: moderate pelvic floor muscle excursion     TREATMENT: THERAPEUTIC EXERCISE: (0 minutes):  Exercises per grid below to improve mobility, strength and coordination. Required minimal visual, verbal and manual cues to promote proper body alignment, promote proper body posture and promote proper body mechanics. Progressed resistance, range and repetitions as indicated. All exercises performed with emphasis on kegel and breathing in order improve coordination, awareness and to minimize symptoms. Date:  8-12-20 Date:  8-18-20 Date:     Activity/Exercise Parameters Parameters Parameters   Patient Education Discussed HEP and POC     Drops Reviewed for HEP     Breathing Reviewed for HEP     Figure 4  30 sec hold x 3      butterfly  30 sec hold x 3                     Pt gives verbal consent to internal  assessment/treatment no chaperon present. MANUAL THERAPY: (45 minutes) to improve soft tissue tone    Date Type Location Comments   10/26/2020 Internal assessment/treatment Rectal canal CTM, SP, traction to coccyx, drops      Discussed urge suppression techniques starting at his \"twilight zone\"                                   (Used abbreviations: MET - muscle energy technique; SCS- Strain counter strain; CTM-Connective tissue mobilizations; CR- Contract/relax; SP- Sustained pressure, TrP-Trigger point release, IASTM- Instrument assisted soft tissue mobilizations, TDN-Trigger point dry needling)        [email or web address] Portal     Treatment/Session Summary:  Pt reports good understanding of plan of care, as well as prescribed home exercise program.  All questions were answered to pt's satisfaction. Pt was invited to call with any further questions or concerns. · Response to Treatment:  Patient had increased tightness to pelvic floor today due to his eating and alcohol consumption this past week. We will try for once a week to see how he does.  He also got in with a nutritionist for [facility]      · Communication/Consultation:  None today  · Equipment provided today:  None today  · Recommendations/Intent for next treatment session: Next visit will focus on manual therapy, defecation dynamics, stretches.   Total Treatment Billable Duration:   45 minutes manual  PT Patient Time In/Time Out  Time In: 0800  Time Out: 727 [facility], DPT    Future Appointments   Date Time Provider Delmar Green   10/29/2020  8:00 AM Suzy Penn, VITALIYT [facility]   11/2/2020  8:00 AM Suzy Penn, [facility]   11/5/2020  8:00 AM Suzy Penn, [facility]   11/9/2020  8:00 AM Suzy Penn, [facility]   11/12/2020  8:00 AM Suzy Penn, [facility]   11/16/2020  8:00 AM Suzy Penn, [facility]   11/19/2020  8:00 AM Suzy Penn, [facility]   11/23/2020  8:00 AM Suzy Penn, [facility]   11/25/2020  8:00 AM Suzy Penn, [facility]   11/30/2020  8:00 AM Suzy Penn, [facility]   12/3/2020  8:00 AM Suzy Penn, [facility]

## 2020-11-02 ENCOUNTER — HOSPITAL ENCOUNTER (OUTPATIENT)
Dept: PHYSICAL THERAPY | Age: 60
Discharge: HOME | End: 2020-11-02
Payer: MEDICARE

## 2020-11-02 PROCEDURE — 97140 MANUAL THERAPY 1/> REGIONS: CPT

## 2020-11-02 NOTE — PROGRESS NOTES
Mariely Trevino  : 1960  [place]  Secondary:  [address]  at [facility]  [address], [address], [place] [address]  [phone]   [phone]      OUTPATIENT PHYSICAL THERAPY: Daily Treatment Note 2020  ICD-10: Treatment Diagnosis: K59.02 Outlet dysfunction constipation, R27.8 Lack of coordination (muscle incoordination), M62.83 Muscle spasm    Precautions/Allergies:   Patient has no allergy information on record. TREATMENT PLAN:  Effective Dates: 10-1-20 to 20 (90 days). Frequency/Duration: Follow 2 time a week for 6 weeks MEDICAL/REFERRING DIAGNOSIS:  Outlet dysfunction constipation [K59.02]   DATE OF ONSET: 3 years  REFERRING PHYSICIAN: Leopoldo Lawson MD MD Orders: evaluate and treat  Return MD Appointment:2020     Pre-treatment Symptoms/Complaints:  Patient reports he went back to miralax for a few days and he felt the same feeling like his colon closed up. He has had a bad week. Pain: Initial:   0 Post Session:  0/10   Medications Last Reviewed:  2020   Updated Objective Findings:  see below   Pt is a 57 yo male referred to pelvic floor physical therapy (PFPT) 2/2 outlet dysfunction by Leopoldo Lawson MD.  Patient reports his colon closed up after his heart surgery (3 years ago). It took him a while for an MD to listen to him. The pain is gone from his colon but he has trouble pooping. He has to make it liquid in order to poop. He takes dulcolax and prune juice. Miralax doesn't make it liquid anymore. Was doing physical therapy in [facility] for pelvic floor but they did not do any internal. The drops have helped. He has to go to bed at 6 at night because he will have to poop all night.      Urinary: Frequency 5-9 x/day, 0 x/night. Positive for none.  Negative for urinary frequency, incomplete emptying and urinary hesitancy/intermittency, stress urinary incontinence, urge urinary incontinence, urinary urgency, dysuria, hematuria, nocturia and nocturnal enuresis. Pt does not use pads for protection; 0 pads per day (PPD). Fluid intake: 3-4 bottles  water/day; bladder irritants include: decaf tea, coffee in am  Bowel: Frequency every 12 hours (2-3 hours to get it out). Positive for pain with bowel movement, pushing/straining with bowel movement, constipation and incomplete emptying. Negative for fecal incontinence. Pt does not use pads for protection; 0 pads per day (PPD). Use of stool softeners or laxatives? YES (Miralax)  Sexual: Pt is sexually active with female partners. negative for dyspareunia. History of sexual abuse:  NO:          Pelvic Pain: varies, more rectal pain  External Observations:  · Voluntary contraction: []? absent     [x]? present  · Skin integrity: intact     Pelvic Floor Muscle (PFM) Assessment:     · Muscle volume: []? decreased     [x]? WNL     []? Defect  · PFM tension: []? decreased     [x]? increased     []? WNL     Contraction ability:  Voluntary contraction: []? absent     []? weak     [x]? moderate      []? strong  Voluntary relaxation: []? absent     [x]? partial     []? complete   MMT: 3/5   Overflow: []? absent     [x]? min     []? mod     []? severe / Compensatory mm groups include abomindals           Palpation: via rectal canal   Superficial Pelvic Floor Musculature (PFM): Tender? Intermediate PFM Tender? Deep PFM Tender? Superficial Transverse Perineal []? Right  []? Left  [x]? N/T Deep Transverse Perineal []? Right  []? Left  [x]? N/T Puborectalis [x]? Right  [x]? Left  []? N/T   Ischiocavernosus []? Right  []? Left  [x]? N/T Compressor Urethra []? Right  []? Left  [x]? N/T Pubococcygeus [x]? Right  [x]? Left  []? N/T   Bulbocavernosus []? Right  []? Left  [x]? N/T     Ileococcygeus [x]? Right  [x]? Left  []? N/T           Obturator Internus [x]? Right  [x]? Left  []? N/T           Coccygeus [x]? Right  [x]? Left  []? N/T         Breath assessment:  Coordination of pelvic floor muscles with breath: moderate pelvic floor muscle excursion     TREATMENT:   THERAPEUTIC EXERCISE: (0 minutes):  Exercises per grid below to improve mobility, strength and coordination. Required minimal visual, verbal and manual cues to promote proper body alignment, promote proper body posture and promote proper body mechanics. Progressed resistance, range and repetitions as indicated. All exercises performed with emphasis on kegel and breathing in order improve coordination, awareness and to minimize symptoms. Date:  8-12-20 Date:  8-18-20 Date:     Activity/Exercise Parameters Parameters Parameters   Patient Education Discussed HEP and POC     Drops Reviewed for HEP     Breathing Reviewed for HEP     Figure 4  30 sec hold x 3      butterfly  30 sec hold x 3                     Pt gives verbal consent to internal  assessment/treatment no chaperon present. MANUAL THERAPY: (45 minutes) to improve soft tissue tone    Date Type Location Comments   11/2/2020 Internal assessment/treatment Rectal canal CTM, SP, traction to coccyx, drops      Discussed urge suppression techniques starting at his \"twilight zone\"                                   (Used abbreviations: MET - muscle energy technique; SCS- Strain counter strain; CTM-Connective tissue mobilizations; CR- Contract/relax; SP- Sustained pressure, TrP-Trigger point release, IASTM- Instrument assisted soft tissue mobilizations, TDN-Trigger point dry needling)        [employer] Portal     Treatment/Session Summary:  Pt reports good understanding of plan of care, as well as prescribed home exercise program.  All questions were answered to pt's satisfaction. Pt was invited to call with any further questions or concerns. · Response to Treatment:  Patient had some increased tightness today but still not as bad as it used to be. Coordination is much better.  Discussed following the diet in the book he got from the recommendation of the nutritionist. Due to his insurance he is unable to go the nutritionist here and still has the appointment in [place] in Jan.      · Communication/Consultation:  None today  · Equipment provided today:  None today  · Recommendations/Intent for next treatment session: Next visit will focus on manual therapy, defecation dynamics, stretches.   Total Treatment Billable Duration:   45 minutes manual  PT Patient Time In/Time Out  Time In: 0800  Time Out: [facility], DPT    Future Appointments   Date Time Provider Delmar Green   11/5/2020  8:00 AM Kay Camacho, DPT [facility]   11/9/2020  8:00 AM Kay Camacho, DPT [facility]   11/12/2020  8:00 AM Kay Camacho, DPT [facility]   11/16/2020  8:00 AM Kay Camacho, DPT [facility]   11/19/2020  8:00 AM Kay Camacho, DPT [facility]   11/23/2020  8:00 AM Kay Camacho, DPT [facility]   11/25/2020  8:00 AM Kay Camacho, DPT [facility]   11/30/2020  8:00 AM Kay Camacho, DPT [facility]   12/3/2020  8:00 AM Kay Camacho, DPT [facility]

## 2020-11-05 ENCOUNTER — APPOINTMENT (OUTPATIENT)
Dept: PHYSICAL THERAPY | Age: 60
End: 2020-11-05
Payer: MEDICARE

## 2020-11-09 ENCOUNTER — HOSPITAL ENCOUNTER (OUTPATIENT)
Dept: PHYSICAL THERAPY | Age: 60
Discharge: HOME | End: 2020-11-09
Payer: MEDICARE

## 2020-11-09 NOTE — PROGRESS NOTES
Cally Quijano  : 1960  [place]  Secondary:  [address]  at [facility]  [address], [address] [address],[address], [place] [address]  [phone]   [phone]                DATE: 2020    Patient called to cancel appointment  today due to illness. Will plan to follow up on next scheduled visit.     Venus Escobar DPT

## 2020-11-12 ENCOUNTER — APPOINTMENT (OUTPATIENT)
Dept: PHYSICAL THERAPY | Age: 60
End: 2020-11-12
Payer: MEDICARE

## 2020-11-16 ENCOUNTER — HOSPITAL ENCOUNTER (OUTPATIENT)
Dept: PHYSICAL THERAPY | Age: 60
Discharge: HOME | End: 2020-11-16
Payer: MEDICARE

## 2020-11-16 PROCEDURE — 97140 MANUAL THERAPY 1/> REGIONS: CPT

## 2020-11-16 NOTE — PROGRESS NOTES
Shantel Branch  : 1960  [place]  Secondary:  [address]  at [facility]  [address], [place] [address]  [phone]   [phone]      OUTPATIENT PHYSICAL THERAPY: Daily Treatment Note 2020  ICD-10: Treatment Diagnosis: K59.02 Outlet dysfunction constipation, R27.8 Lack of coordination (muscle incoordination), M62.83 Muscle spasm    Precautions/Allergies:   Patient has no allergy information on record. MEDICAL/REFERRING DIAGNOSIS:  Outlet dysfunction constipation [K59.02]   DATE OF ONSET: 3 years  REFERRING PHYSICIAN: Lee Neely MD MD Orders: evaluate and treat  Return MD Appointment:20, going to nutritionist 2020     Pre-treatment Symptoms/Complaints:  Patient reports the fiber messed him up. Reports if its not liquid its not coming out. He got an X-ray of the tailbone and the MD doesn't see any fractures or abnormalities. Still taking the dulcolax. Pain: Initial:   0 Post Session:  0/10   Medications Last Reviewed:  2020   Updated Objective Findings:  see below   Pt is a 55 yo male referred to pelvic floor physical therapy (PFPT) 2/2 outlet dysfunction by Lee Neely MD.  Patient reports his colon closed up after his heart surgery (3 years ago). It took him a while for an MD to listen to him. The pain is gone from his colon but he has trouble pooping. He has to make it liquid in order to poop. He takes dulcolax and prune juice. Miralax doesn't make it liquid anymore. Was doing physical therapy in [facility] for pelvic floor but they did not do any internal. The drops have helped. He has to go to bed at 6 at night because he will have to poop all night.      Urinary: Frequency 5-9 x/day, 0 x/night. Positive for none.  Negative for urinary frequency, incomplete emptying and urinary hesitancy/intermittency, stress urinary incontinence, urge urinary incontinence, urinary urgency, dysuria, hematuria, nocturia and nocturnal enuresis. Pt does not use pads for protection; 0 pads per day (PPD). Fluid intake: 3-4 bottles  water/day; bladder irritants include: decaf tea, coffee in am  Bowel: Frequency every 12 hours (2-3 hours to get it out). Positive for pain with bowel movement, pushing/straining with bowel movement, constipation and incomplete emptying. Negative for fecal incontinence. Pt does not use pads for protection; 0 pads per day (PPD). Use of stool softeners or laxatives? YES (Miralax)  Sexual: Pt is sexually active with female partners. negative for dyspareunia. History of sexual abuse:  NO:          Pelvic Pain: varies, more rectal pain  External Observations:  · Voluntary contraction: []? absent     [x]? present  · Skin integrity: intact     Pelvic Floor Muscle (PFM) Assessment:     · Muscle volume: []? decreased     [x]? WNL     []? Defect  · PFM tension: []? decreased     [x]? increased     []? WNL     Contraction ability:  Voluntary contraction: []? absent     []? weak     [x]? moderate      []? strong  Voluntary relaxation: []? absent     [x]? partial     []? complete   MMT: 3/5   Overflow: []? absent     [x]? min     []? mod     []? severe / Compensatory mm groups include abomindals           Palpation: via rectal canal   Superficial Pelvic Floor Musculature (PFM): Tender? Intermediate PFM Tender? Deep PFM Tender? Superficial Transverse Perineal []? Right  []? Left  [x]? N/T Deep Transverse Perineal []? Right  []? Left  [x]? N/T Puborectalis [x]? Right  [x]? Left  []? N/T   Ischiocavernosus []? Right  []? Left  [x]? N/T Compressor Urethra []? Right  []? Left  [x]? N/T Pubococcygeus [x]? Right  [x]? Left  []? N/T   Bulbocavernosus []? Right  []? Left  [x]? N/T     Ileococcygeus [x]? Right  [x]? Left  []? N/T           Obturator Internus [x]? Right  [x]? Left  []? N/T           Coccygeus [x]? Right  [x]? Left  []? N/T         Breath assessment:  Coordination of pelvic floor muscles with breath: moderate pelvic floor muscle excursion     TREATMENT:   THERAPEUTIC EXERCISE: (0 minutes):  Exercises per grid below to improve mobility, strength and coordination. Required minimal visual, verbal and manual cues to promote proper body alignment, promote proper body posture and promote proper body mechanics. Progressed resistance, range and repetitions as indicated. All exercises performed with emphasis on kegel and breathing in order improve coordination, awareness and to minimize symptoms. Date:  8-12-20 Date:  8-18-20 Date:     Activity/Exercise Parameters Parameters Parameters   Patient Education Discussed HEP and POC     Drops Reviewed for HEP     Breathing Reviewed for HEP     Figure 4  30 sec hold x 3      butterfly  30 sec hold x 3                     Pt gives verbal consent to internal  assessment/treatment no chaperon present. MANUAL THERAPY: (45 minutes) to improve soft tissue tone    Date Type Location Comments   11/16/2020 Internal assessment/treatment Rectal canal CTM, SP, traction to coccyx, drops                                         (Used abbreviations: MET - muscle energy technique; SCS- Strain counter strain; CTM-Connective tissue mobilizations; CR- Contract/relax; SP- Sustained pressure, TrP-Trigger point release, IASTM- Instrument assisted soft tissue mobilizations, TDN-Trigger point dry needling)        [employer] Portal     · Response to Treatment:  Patient continues to have some tightness to pelvic floor muscles. Had a discussion with the patient about his progress and plan. He feels that his muscles have responded but he feels that its his intestines that haven't. Discussed going back to MD with his questions and concerns as well as seeing the nutritionist in Jan. Discussed that if he needs to re turn in the new year to get a new order.        Total Treatment Billable Duration:   45 minutes manual  PT Patient Time In/Time Out  Time In: 0800  Time Out: 0900  Олег Ruth Ynes Chapman, DPT    Future Appointments   Date Time Provider Delmar Green   11/23/2020  8:00 AM [facility]   11/30/2020  8:00 AM [facility]

## 2020-11-16 NOTE — THERAPY DISCHARGE
Sherif Antonio : 1960 [place] Secondary:  [facility]rapy Center at [facility] [address], [address], [place] [address] [phone]   [phone] OUTPATIENT PHYSICAL THERAPY:Discharge 2020 ICD-10: Treatment Diagnosis: K59.02 Outlet dysfunction constipation, R27.8 Lack of coordination (muscle incoordination), M62.83 Muscle spasm Precautions/Allergies:  
Patient has no allergy information on record. MEDICAL/REFERRING DIAGNOSIS: 
Outlet dysfunction constipation [K59.02] DATE OF ONSET: 3 years REFERRING PHYSICIAN: Hercules Spurling, MD MD Orders: evaluate and treat Return MD Appointment: 20 DISCHARGE SUMMARY: Mr. Catalina Esparza has been seen in skilled PT from 20 to 20. Patient has attended 17 out of 19 scheduled visits, with 2 cancellation(s) and 0 no shows. Treatment has emphasized manal therapy, toilet mechanics, down training, dietary modifications. Patient responded well to therapy, with improvements in decreasing muscle tone and tightness and is no longer taking Miralax. He also demonstrates good coordination of PFM. Patient continues to take Dulcolax and has to have a pudding like texture to pass a bowel movement. At this time patient has been educated to follow up with the MD as well as start with a nutritionist.  Pt was invited to call with any further questions or concerns as needed. REASSESSMENT: Mr. Catalina Esparza has been seen in skilled PT from 20 to 10-1-20. Patient has attended 10 out of 10 scheduled visits, with 0 cancellation(s) and 0 no shows. Treatment has emphasized manual therapy, toilet mechanics, down training.  Patient has made improvements in ability to have a soft bowel movement without pushing straining and has cut back on his dulcolax, however continues to demonstrate deficits in tightness of pelvic floor muslces contributing to inability to evacuate bowels completely. Pt has progressed with goals as indicated below at this point and will continue to benefit from skilled PT in order to achieve remaining goals and maximize functional outcomes in order to return to [facility]. INITIAL ASSESSMENT:  Mr. Myriam Padron presents with over activity and impaired coordination of pelvic floor muscles. He has a complex past medical history of bowel dysfunction. He is unable to coordinate a pelvic floor muscle contraction or relaxation. He required verbal cuing for proper activation and isolation of pelvic floor muscles. He has pain with palpation to the coccyx and PFM via rectal examination. He will continue to benefit from skilled physical therapy to address above mentioned deficits and restore normal PFM function post operatively to minimize urinary incontinence GOALS: (Goals have been discussed and agreed upon with patient.) Short Term Goals: (3 weeks) 1. Pt will demonstrate understanding of and ability to teach back appropriate water and fiber intake, toileting frequency, and positioning for improved self-management of symptoms. MET 2. Pt will demonstrate understanding of and ability to teach back two strategies to reduce constipation and improve toileting to minimize strain on the pelvic floor muscles. MET 3. Pt will demonstrate ability to perform diaphragmatic breathing in multiple positions in order to assist with pelvic floor lengthening and reduced discomfort to aid in bowel evacuation. MET 4. Pt will engage in 30 minutes of physical activity per day in order to assist in digestion and ease of passing stools. Long Term Goals: (6 weeks) 1. Pt will demonstrate ability to increase intra-abdominal pressure and eccentrically contract the pelvic floor muscles without breath holding, as assessed by digitally or with use of sEMG biofeedback.  
2. Pt will report minimal to no pain upon examination of the levator ani muscles and report minimal or no discomfort with bowel evacuation. 3. Pt will report being able to wean from taking stool softeners and laxatives to have a bowel movement Urinary: Frequency 5-9 x/day, 0 x/night. Positive for none. Negative for urinary frequency, incomplete emptying and urinary hesitancy/intermittency, stress urinary incontinence, urge urinary incontinence, urinary urgency, dysuria, hematuria, nocturia and nocturnal enuresis. Pt does not use pads for protection; 0 pads per day (PPD). Fluid intake: 3-4 bottles  water/day; bladder irritants include: decaf tea, coffee in am 
Bowel: Frequency every 12 hours (2-3 hours to get it out). Positive for pain with bowel movement, pushing/straining with bowel movement, constipation and incomplete emptying. Negative for fecal incontinence. Pt does not use pads for protection; 0 pads per day (PPD). Use of stool softeners or laxatives? YES (Miralax) Sexual: Pt is sexually active with female partners. negative for dyspareunia. History of sexual abuse:  NO:  
       Pelvic Pain: varies, more rectal pain External Observations: · Voluntary contraction: []? absent     [x]? present · Skin integrity: intact 
  
Pelvic Floor Muscle (PFM) Assessment: 
  
· Muscle volume: []? decreased     [x]? WNL     []? Defect · PFM tension: []? decreased     [x]? increased     []? WNL 
  
Contraction ability: 
Voluntary contraction: []? absent     []? weak     [x]? moderate      []? strong Voluntary relaxation: []? absent     [x]? partial     []? complete MMT: 3/5 Overflow: []? absent     [x]? min     []? mod     []? severe / Compensatory mm groups include abomindals 
  
  
  
Palpation: via rectal canal  
Superficial Pelvic Floor Musculature (PFM): Tender? Intermediate PFM Tender? Deep PFM Tender? Superficial Transverse Perineal []? Right  []? Left  [x]? N/T Deep Transverse Perineal []? Right  []? Left  [x]? N/T Puborectalis [x]? Right  [x]? Left  []? N/T  
 Ischiocavernosus []? Right  []? Left  [x]? N/T Compressor Urethra []? Right  []? Left  [x]? N/T Pubococcygeus [x]? Right  [x]? Left  []? N/T Bulbocavernosus []? Right  []? Left  [x]? N/T     Ileococcygeus [x]? Right  [x]? Left  []? N/T  
        Obturator Internus [x]? Right  [x]? Left  []? N/T  
        Coccygeus [x]? Right  [x]? Left  []? N/T  
  
  
Breath assessment: 
Coordination of pelvic floor muscles with breath: moderate pelvic floor muscle excursion Outcome Measure: NIH- Chronic Prostatitis Symptom Index (NIH-CPSI) for Males Initial:  Most Recent:   
Pain 0 0 Urinary Symptoms 0 0 QOL Index 0 0 Interpretation of Score: The NIH-CPSI has a total score range from 0 to 43, and it includes three subscales addressing pain (score range 021), urinary symptoms (score range 010), and quality of life (QOL) (score range 012). CONSTIPATION SCORING SYSTEM:  
17/30 Total Duration: PT Patient Time In/Time Out Time In: 0800 Time Out: 0900 Regarding [facility] therapy, I certify that the treatment plan above will be carried out by a therapist or under their direction. Thank you for this referral, 
Robert Bae DPT Referring Physician Signature: Mike Torres MD              Date

## 2020-11-19 ENCOUNTER — APPOINTMENT (OUTPATIENT)
Dept: PHYSICAL THERAPY | Age: 60
End: 2020-11-19
Payer: MEDICARE

## 2020-11-23 ENCOUNTER — APPOINTMENT (OUTPATIENT)
Dept: PHYSICAL THERAPY | Age: 60
End: 2020-11-23
Payer: MEDICARE

## 2020-11-25 ENCOUNTER — APPOINTMENT (OUTPATIENT)
Dept: PHYSICAL THERAPY | Age: 60
End: 2020-11-25
Payer: MEDICARE

## 2020-11-25 NOTE — PROGRESS NOTES
I spoke with Dr. Priya Rodriguez on 11/23/20 about the care and progress of Darian Pisano. We discussed that he has made some improvements in muscle tone and pushing mechanics since starting physical therapy back in August 2020 but at this time he has plateaued. We discussed that at this time the patient should see the nutritionist in January and get his diet and food choices under control before continuing with physical therapy. Both the doctor and I are in agreement with this plan. We have reached out to the  and wife about the plan of care and discharge.      Lacy Chapman, VITALIYT

## 2020-11-30 ENCOUNTER — APPOINTMENT (OUTPATIENT)
Dept: PHYSICAL THERAPY | Age: 60
End: 2020-11-30
Payer: MEDICARE

## 2020-12-03 ENCOUNTER — APPOINTMENT (OUTPATIENT)
Dept: PHYSICAL THERAPY | Age: 60
End: 2020-12-03